# Patient Record
Sex: FEMALE | Race: WHITE | NOT HISPANIC OR LATINO | Employment: UNEMPLOYED | URBAN - NONMETROPOLITAN AREA
[De-identification: names, ages, dates, MRNs, and addresses within clinical notes are randomized per-mention and may not be internally consistent; named-entity substitution may affect disease eponyms.]

---

## 2021-04-23 LAB
ALBUMIN SERPL-MCNC: 4.1 G/DL (ref 3.4–5)
ALBUMIN/GLOB SERPL: 1.2 {RATIO}
ALP SERPL-CCNC: 96 U/L (ref 50–144)
ALT SERPL-CCNC: 11 U/L (ref 1–45)
ANION GAP SERPL CALC-SCNC: 7 MMOL/L (ref 7–16)
AST SERPL-CCNC: 17 U/L (ref 14–36)
BILIRUB SERPL-MCNC: 0.55 MG/DL (ref 0.1–1)
BUN SERPL-MCNC: 13 MG/DL (ref 7–20)
CALCIUM SERPL-MCNC: 9.4 MG/DL (ref 8.4–10.2)
CHLORIDE SERPL-SCNC: 101 MMOL/L (ref 94–110)
CHOLEST SERPL-MCNC: 236 MG/DL (ref 0–200)
CO2 SERPL-SCNC: 32 MMOL/L (ref 21–32)
CREAT SERPL-MCNC: 0.7 MG/DL (ref 0.52–1.04)
CREAT/UREA NIT SERPL: 18.6 (ref 12–20)
DEPRECATED CALCIDIOL+CALCIFEROL SERPL-MC: <20 NG/ML (ref 30–100)
GLOBULIN SER-MCNC: 3.5 G/DL (ref 2–3.9)
GLUCOSE SERPL-MCNC: 106 MGM./DL (ref 70–115)
HDLC SERPL-MCNC: 40 MG/DL (ref 40–60)
LDLC SERPL CALC-MCNC: 159 MG/DL (ref 30–100)
POTASSIUM SERPL-SCNC: 4.3 MMOL/L (ref 3.5–5.1)
PROT SERPL-MCNC: 7.6 G/DL (ref 6.3–8.2)
SODIUM SERPL-SCNC: 140 MMOL/L (ref 135–145)
TRIGL SERPL-MCNC: 154 MG/DL (ref 30–200)
TSH SERPL-ACNC: 1.98 UIU/ML (ref 0.36–3.74)

## 2021-04-26 ENCOUNTER — HISTORICAL (OUTPATIENT)
Dept: ADMINISTRATIVE | Facility: HOSPITAL | Age: 56
End: 2021-04-26

## 2021-05-06 ENCOUNTER — HISTORICAL (OUTPATIENT)
Dept: ADMINISTRATIVE | Facility: HOSPITAL | Age: 56
End: 2021-05-06

## 2021-05-24 ENCOUNTER — HISTORICAL (OUTPATIENT)
Dept: ADMINISTRATIVE | Facility: HOSPITAL | Age: 56
End: 2021-05-24

## 2021-05-24 LAB
BASOPHILS # BLD AUTO: 0.01 X10(3)/MCL (ref 0.01–0.08)
BASOPHILS NFR BLD AUTO: 0.1 % (ref 0.1–1.2)
EOSINOPHIL # BLD AUTO: 0 X10(3)/MCL (ref 0.04–0.36)
EOSINOPHIL NFR BLD AUTO: 0 % (ref 0.7–7)
ERYTHROCYTE [DISTWIDTH] IN BLOOD BY AUTOMATED COUNT: 13.5 % (ref 11–14.5)
HCT VFR BLD AUTO: 42.2 % (ref 36–48)
HGB BLD-MCNC: 13.2 G/DL (ref 11.8–16)
IMM GRANULOCYTES # BLD AUTO: 0.04 X10E3/UL (ref 0–0.03)
IMM GRANULOCYTES NFR BLD AUTO: 0.3 % (ref 0–0.5)
LYMPHOCYTES # BLD AUTO: 1.14 X10(3)/MCL (ref 1.16–3.74)
LYMPHOCYTES NFR BLD AUTO: 7.9 % (ref 20–55)
MCH RBC QN AUTO: 26.8 PG (ref 27–34)
MCHC RBC AUTO-ENTMCNC: 31.3 G/DL (ref 31–37)
MCV RBC AUTO: 85.6 FL (ref 79–99)
MONOCYTES # BLD AUTO: 1.02 X10(3)/MCL (ref 0.24–0.36)
MONOCYTES NFR BLD AUTO: 7.1 % (ref 4.7–12.5)
NEUTROPHILS # BLD AUTO: 12.21 X10(3)/MCL (ref 1.56–6.13)
NEUTROPHILS NFR BLD AUTO: 84.6 % (ref 37–73)
PLATELET # BLD AUTO: 342 X10(3)/MCL (ref 140–371)
PMV BLD AUTO: 9.9 FL (ref 9.4–12.4)
RBC # BLD AUTO: 4.93 X10(6)/MCL (ref 4–5.1)
WBC # SPEC AUTO: 14.4 X10(3)/MCL (ref 4–11.5)

## 2021-06-22 ENCOUNTER — HISTORICAL (OUTPATIENT)
Dept: ADMINISTRATIVE | Facility: HOSPITAL | Age: 56
End: 2021-06-22

## 2021-12-30 LAB
ALBUMIN SERPL-MCNC: 3.7 G/DL (ref 3.4–5)
ALBUMIN/GLOB SERPL: 1.3 {RATIO}
ALP SERPL-CCNC: 90 U/L (ref 50–144)
ALT SERPL-CCNC: 17 U/L (ref 1–45)
ANION GAP SERPL CALC-SCNC: 0 MMOL/L (ref 2–13)
AST SERPL-CCNC: 20 U/L (ref 14–36)
BASOPHILS # BLD AUTO: 0.06 10*3/UL (ref 0.01–0.08)
BASOPHILS NFR BLD AUTO: 0.7 % (ref 0.1–1.2)
BILIRUB SERPL-MCNC: 0.36 MG/DL (ref 0–1)
BUN SERPL-MCNC: 19 MG/DL (ref 7–20)
CALCIUM SERPL-MCNC: 9.3 MG/DL (ref 8.4–10.2)
CHLORIDE SERPL-SCNC: 105 MMOL/L (ref 94–110)
CHOLEST SERPL-MCNC: 212 MG/DL (ref 0–200)
CO2 SERPL-SCNC: 32 MMOL/L (ref 21–32)
CREAT SERPL-MCNC: 0.8 MG/DL (ref 0.52–1.04)
CREAT/UREA NIT SERPL: 23.8 (ref 12–20)
DEPRECATED CALCIDIOL+CALCIFEROL SERPL-MC: <20 NG/ML (ref 30–100)
EOSINOPHIL # BLD AUTO: 0.18 10*3/UL (ref 0.04–0.36)
EOSINOPHIL NFR BLD AUTO: 2.1 % (ref 0.7–7)
ERYTHROCYTE [DISTWIDTH] IN BLOOD BY AUTOMATED COUNT: 13.8 % (ref 11–14.5)
EST. AVERAGE GLUCOSE BLD GHB EST-MCNC: 135 MG/DL (ref 70–115)
GLOBULIN SER-MCNC: 2.9 G/DL (ref 2–3.9)
GLUCOSE SERPL-MCNC: 108 MGM./DL (ref 70–115)
HBA1C MFR BLD: 6.5 % (ref 4–6)
HCT VFR BLD AUTO: 40 % (ref 36–48)
HDLC SERPL-MCNC: 43 MG/DL (ref 40–60)
HGB BLD-MCNC: 12.5 G/DL (ref 11.8–16)
IMM GRANULOCYTES # BLD AUTO: 0.04 10*3/UL (ref 0–0.03)
IMM GRANULOCYTES NFR BLD AUTO: 0.5 % (ref 0–0.5)
LDLC SERPL CALC-MCNC: 139.6 MG/DL (ref 30–100)
LYMPHOCYTES # BLD AUTO: 2.06 10*3/UL (ref 1.16–3.74)
LYMPHOCYTES NFR BLD AUTO: 23.8 % (ref 20–55)
MCH RBC QN AUTO: 27.5 PG (ref 27–34)
MCHC RBC AUTO-ENTMCNC: 31.3 G/DL (ref 31–37)
MCV RBC AUTO: 87.9 FL (ref 79–99)
MONOCYTES # BLD AUTO: 0.78 10*3/UL (ref 0.24–0.36)
MONOCYTES NFR BLD AUTO: 9 % (ref 4.7–12.5)
NEUTROPHILS # BLD AUTO: 5.55 10*3/UL (ref 1.56–6.13)
NEUTROPHILS NFR BLD AUTO: 63.9 % (ref 37–73)
NONINV COLON CA DNA+OCC BLD SCRN STL QL: POSITIVE
PLATELET # BLD AUTO: 342 10*3/UL (ref 140–371)
PMV BLD AUTO: 10.1 FL (ref 9.4–12.4)
POTASSIUM SERPL-SCNC: 4.6 MMOL/L (ref 3.5–5.1)
PROT SERPL-MCNC: 6.6 G/DL (ref 6.3–8.2)
RBC # BLD AUTO: 4.55 10*6/UL (ref 4–5.1)
SODIUM SERPL-SCNC: 137 MMOL/L (ref 135–145)
TRIGL SERPL-MCNC: 180 MG/DL (ref 30–200)
TSH SERPL-ACNC: 1.56 UIU/ML (ref 0.36–3.74)
WBC # SPEC AUTO: 8.7 10*3/UL (ref 4–11.5)

## 2022-01-13 ENCOUNTER — HISTORICAL (OUTPATIENT)
Dept: ADMINISTRATIVE | Facility: HOSPITAL | Age: 57
End: 2022-01-13

## 2022-04-10 ENCOUNTER — HISTORICAL (OUTPATIENT)
Dept: ADMINISTRATIVE | Facility: HOSPITAL | Age: 57
End: 2022-04-10
Payer: MEDICAID

## 2022-04-26 VITALS
BODY MASS INDEX: 35.5 KG/M2 | WEIGHT: 200.38 LBS | DIASTOLIC BLOOD PRESSURE: 72 MMHG | HEIGHT: 63 IN | SYSTOLIC BLOOD PRESSURE: 115 MMHG | OXYGEN SATURATION: 97 %

## 2022-05-04 ENCOUNTER — HISTORICAL (OUTPATIENT)
Dept: ADMINISTRATIVE | Facility: HOSPITAL | Age: 57
End: 2022-05-04
Payer: MEDICAID

## 2022-06-20 ENCOUNTER — HISTORICAL (OUTPATIENT)
Dept: ADMINISTRATIVE | Facility: HOSPITAL | Age: 57
End: 2022-06-20

## 2022-06-22 ENCOUNTER — HISTORICAL (OUTPATIENT)
Dept: ADMINISTRATIVE | Facility: HOSPITAL | Age: 57
End: 2022-06-22

## 2022-09-29 ENCOUNTER — OFFICE VISIT (OUTPATIENT)
Dept: OTOLARYNGOLOGY | Facility: CLINIC | Age: 57
End: 2022-09-29
Payer: MEDICAID

## 2022-09-29 VITALS
HEIGHT: 63 IN | OXYGEN SATURATION: 97 % | BODY MASS INDEX: 33.25 KG/M2 | WEIGHT: 187.63 LBS | TEMPERATURE: 99 F | DIASTOLIC BLOOD PRESSURE: 75 MMHG | SYSTOLIC BLOOD PRESSURE: 133 MMHG | HEART RATE: 84 BPM

## 2022-09-29 DIAGNOSIS — J31.0 CHRONIC RHINITIS: Primary | ICD-10-CM

## 2022-09-29 PROCEDURE — 99213 OFFICE O/P EST LOW 20 MIN: CPT | Mod: PBBFAC | Performed by: OTOLARYNGOLOGY

## 2022-09-29 RX ORDER — BUDESONIDE, GLYCOPYRROLATE, AND FORMOTEROL FUMARATE 160; 9; 4.8 UG/1; UG/1; UG/1
2 AEROSOL, METERED RESPIRATORY (INHALATION) 2 TIMES DAILY
COMMUNITY
Start: 2022-09-01 | End: 2023-07-26

## 2022-09-29 RX ORDER — ASPIRIN 325 MG
1250 TABLET, DELAYED RELEASE (ENTERIC COATED) ORAL
COMMUNITY
Start: 2021-12-23 | End: 2023-04-05

## 2022-09-29 RX ORDER — ALBUTEROL SULFATE 90 UG/1
AEROSOL, METERED RESPIRATORY (INHALATION)
COMMUNITY
Start: 2022-09-23 | End: 2022-12-11

## 2022-09-29 RX ORDER — NYSTATIN 100000 [USP'U]/ML
SUSPENSION ORAL
COMMUNITY
Start: 2022-01-19 | End: 2023-04-05 | Stop reason: SDUPTHER

## 2022-09-29 RX ORDER — ALBUTEROL SULFATE 0.83 MG/ML
SOLUTION RESPIRATORY (INHALATION)
COMMUNITY
Start: 2022-06-05 | End: 2022-12-11

## 2022-09-29 RX ORDER — TRIAMCINOLONE ACETONIDE 1 MG/G
OINTMENT TOPICAL 2 TIMES DAILY
COMMUNITY
Start: 2022-06-17

## 2022-09-29 RX ORDER — FLUTICASONE PROPIONATE 50 MCG
2 SPRAY, SUSPENSION (ML) NASAL DAILY
Qty: 15.8 ML | Refills: 12 | Status: SHIPPED | OUTPATIENT
Start: 2022-09-29 | End: 2023-07-26

## 2022-09-29 RX ORDER — ALBUTEROL SULFATE 0.83 MG/ML
2.5 SOLUTION RESPIRATORY (INHALATION)
COMMUNITY
Start: 2022-01-06 | End: 2022-12-11

## 2022-09-29 RX ORDER — BENZONATATE 200 MG/1
200 CAPSULE ORAL
COMMUNITY
Start: 2022-01-19 | End: 2023-12-05 | Stop reason: SDUPTHER

## 2022-09-29 RX ORDER — FLUTICASONE PROPIONATE 50 MCG
1 SPRAY, SUSPENSION (ML) NASAL 2 TIMES DAILY
COMMUNITY
Start: 2022-09-22 | End: 2022-09-29

## 2022-09-29 RX ORDER — MUPIROCIN 20 MG/G
OINTMENT TOPICAL
COMMUNITY
Start: 2022-05-28

## 2022-09-29 RX ORDER — IPRATROPIUM BROMIDE 0.5 MG/2.5ML
SOLUTION RESPIRATORY (INHALATION)
COMMUNITY
Start: 2022-08-22

## 2022-09-29 NOTE — PROGRESS NOTES
Patient Name: Ricardo Daley   YOB: 1965     Chief Complaint:   Chief Complaint   Patient presents with    Follow-up     Has hoarseness that pt states is from coughing due to COPD        History of Present Illness:  56-year-old female here with a complaint of right nasal obstruction. Onset approximately 3 years, no history of local pain, swelling or bleeding. She has severe COPD, stage IV, and is chronically short of breath. With the nasal obstruction on the right which is daily, it is hastens her anxiety about her limitations in her breathing due to her COPD. No history of nasal trauma, nasal surgery, like allergies or sinus disease. Denies any hearing loss, tinnitus, aural fullness, otalgia or vertigo.  Currently down to 3 cigarettes a day and progressing complete cessation.    2-24-22: Patient is a follow-up,, finds Flonase helps significantly and she is breathing much better. When her nasal function improves her COPD is improved.  Patient also asked for referral for her psoriasis that is progressing on her lower extremities and will do so.    September 29, 2022:   57-year-old female presents today for follow-up of her chronic rhinitis.  Patient continues to use fluticasone nasal spray on a routine basis with good control of any nasal obstruction or congestion she may have.  Currently she has no complaints of nasal obstruction.  No rhinorrhea or postnasal drainage.  She does need refill for her fluticasone nasal spray.  She does not have any other new problems today.  Of note she does continue to smoke 3-4 cigarettes per day.  She does continue her efforts to quit smoking.    Past Medical History:  Past Medical History:   Diagnosis Date    COPD (chronic obstructive pulmonary disease)      Past Surgical History:   Procedure Laterality Date    BREAST SURGERY Right     CHOLECYSTECTOMY      HERNIA REPAIR         Review of Systems:  Unremarkable except as mentioned above.    Current Medications:  Current  "Outpatient Medications   Medication Sig    albuterol (PROVENTIL) 2.5 mg /3 mL (0.083 %) nebulizer solution Inhale 2.5 mg into the lungs.    albuterol (PROVENTIL) 2.5 mg /3 mL (0.083 %) nebulizer solution SMARTSIG:3 Milliliter(s) Via Nebulizer Every 6 Hours PRN    albuterol (PROVENTIL/VENTOLIN HFA) 90 mcg/actuation inhaler SMARTSI Puff(s) By Mouth Every 6 Hours    benzonatate (TESSALON) 200 MG capsule Take 200 mg by mouth.    BREZTRI AEROSPHERE 160-9-4.8 mcg/actuation HFAA Inhale 2 puffs into the lungs 2 (two) times daily.    cholecalciferol, vitamin D3, 1,250 mcg (50,000 unit) capsule Take 1,250 mcg by mouth.    fluticasone propionate (FLONASE) 50 mcg/actuation nasal spray 1 spray by Each Nostril route 2 (two) times daily.    ipratropium (ATROVENT) 0.02 % nebulizer solution SMARTSI Vial(s) Via Nebulizer 4 Times Daily PRN    mupirocin (BACTROBAN) 2 % ointment SMARTSIG:Sparingly Topical 3 Times Daily    nystatin (MYCOSTATIN) 100,000 unit/mL suspension Take by mouth.    triamcinolone acetonide 0.1% (KENALOG) 0.1 % ointment Apply topically 2 (two) times daily.     No current facility-administered medications for this visit.        Allergies:  Review of patient's allergies indicates:   Allergen Reactions    Lincocin [lincomycin] Other (See Comments)     unknown    Penicillins Itching        Physical Exam:  Vital signs:   Vitals:    22 0819   BP: 133/75   BP Location: Left arm   Patient Position: Sitting   BP Method: Large (Automatic)   Pulse: 84   Temp: 98.6 °F (37 °C)   TempSrc: Oral   SpO2: 97%   Weight: 85.1 kg (187 lb 9.8 oz)   Height: 5' 3" (1.6 m)   General:  Well-developed well-nourished female in no acute distress.  Voice is normal.  Head and face:  Normocephalic.  No facial lesions.  No temporomandibular joint tenderness or click.  Ears:  Right ear-auricle is normally developed.  External auditory canal is clear.  Tympanic membrane is nonerythematous.  No middle ear effusion.  Left ear-auricle is " normally developed.  External auditory canal is clear.  Tympanic membrane is nonerythematous.  No middle ear effusion.  Nose:  Nasal dorsum unremarkable.  She has slight right septal deviation.  Minimal nasal congestion.  No abnormal drainage or secretions.    Neck:  Supple without adenopathy or thyromegaly.  Trachea is in the midline.  Parotid and submandibular glands are normal to palpation without masses or tenderness.  Eyes:  Extraocular muscles intact.  No nystagmus.  No exophthalmos or enophthalmos.  Neurologic:  Alert and oriented.  Cranial nerves 2-12 are grossly normal.        Assessment/Plan:  Chronic rhinitis-stable.      Plan:  Continue fluticasone nasal spray 2 puffs each nostril q.d..  Refill sent  Follow-up in 12 months.      Sagar Arnett M.D.

## 2022-10-28 ENCOUNTER — HISTORICAL (OUTPATIENT)
Dept: ADMINISTRATIVE | Facility: HOSPITAL | Age: 57
End: 2022-10-28

## 2022-12-11 ENCOUNTER — HOSPITAL ENCOUNTER (EMERGENCY)
Facility: HOSPITAL | Age: 57
Discharge: HOME | End: 2022-12-11
Attending: EMERGENCY MEDICINE
Payer: MEDICAID

## 2022-12-11 VITALS
BODY MASS INDEX: 34.55 KG/M2 | SYSTOLIC BLOOD PRESSURE: 183 MMHG | DIASTOLIC BLOOD PRESSURE: 93 MMHG | WEIGHT: 195 LBS | HEIGHT: 63 IN | RESPIRATION RATE: 22 BRPM | TEMPERATURE: 99 F | OXYGEN SATURATION: 98 % | HEART RATE: 105 BPM

## 2022-12-11 DIAGNOSIS — R21 RASH: Primary | ICD-10-CM

## 2022-12-11 PROCEDURE — 99284 EMERGENCY DEPT VISIT MOD MDM: CPT

## 2022-12-11 PROCEDURE — 63600175 PHARM REV CODE 636 W HCPCS: Performed by: NURSE PRACTITIONER

## 2022-12-11 RX ORDER — OXYCODONE AND ACETAMINOPHEN 10; 325 MG/1; MG/1
1 TABLET ORAL EVERY 6 HOURS PRN
Qty: 10 TABLET | Refills: 0 | Status: SHIPPED | OUTPATIENT
Start: 2022-12-11 | End: 2023-04-05

## 2022-12-11 RX ORDER — PREDNISONE 20 MG/1
40 TABLET ORAL DAILY
Qty: 10 TABLET | Refills: 0 | Status: SHIPPED | OUTPATIENT
Start: 2022-12-11 | End: 2022-12-16

## 2022-12-11 RX ORDER — PREDNISONE 20 MG/1
40 TABLET ORAL
Status: COMPLETED | OUTPATIENT
Start: 2022-12-11 | End: 2022-12-11

## 2022-12-11 RX ORDER — ALBUTEROL SULFATE 90 UG/1
1-2 AEROSOL, METERED RESPIRATORY (INHALATION) EVERY 6 HOURS PRN
Qty: 6.7 G | Refills: 0 | Status: SHIPPED | OUTPATIENT
Start: 2022-12-11 | End: 2023-02-24

## 2022-12-11 RX ADMIN — PREDNISONE 40 MG: 20 TABLET ORAL at 12:12

## 2022-12-11 NOTE — ED NOTES
Pt has rash  covering bilateral lower legs that she states has been there for months and symptoms have exacerbated today.

## 2022-12-11 NOTE — ED PROVIDER NOTES
Encounter Date: 12/11/2022       History     Chief Complaint   Patient presents with    Rash     Pt c/o pain to bilateral pain, pt states she has infection to psoriasis in legs x several months. Pt states finished antibiotics  3 weeks ago and prednisone 2 weeks ago and still having pain and thinks symptoms are getting worse.     Patient complains of bilateral chronic psoriatic rash in her lower extremities.  States that recently she is been on steroids and antibiotics.  She thinks the site was infected it did improve with steroids.  She denies any significant worsening of the condition but that the pain is unmanageable recently.    The history is provided by the patient.   Rash   This is a new problem. The current episode started several weeks ago. The problem has been unchanged. The problem is associated with nothing. Associated symptoms include pain and weeping. Pertinent negatives include no blisters and no itching. She has tried nothing for the symptoms.   Review of patient's allergies indicates:   Allergen Reactions    Lincocin [lincomycin] Other (See Comments)     unknown    Penicillins Itching    Evan-24 [theophylline] Hallucinations    Clobetasol propionate Rash     Past Medical History:   Diagnosis Date    COPD (chronic obstructive pulmonary disease)      Past Surgical History:   Procedure Laterality Date    BREAST SURGERY Right     CHOLECYSTECTOMY      HERNIA REPAIR       Family History   Problem Relation Age of Onset    Stroke Mother     Diabetes Mother     Heart disease Mother      Social History     Tobacco Use    Smoking status: Every Day     Packs/day: 0.25     Types: Cigarettes     Start date: 1983    Smokeless tobacco: Never   Substance Use Topics    Alcohol use: Not Currently    Drug use: Never     Review of Systems   Constitutional:  Negative for fever.   HENT:  Negative for sore throat.    Respiratory:  Positive for cough (Chronic cough associated with COPD.  Denies worsening of this problem.).  Negative for shortness of breath.    Cardiovascular:  Negative for chest pain.   Gastrointestinal:  Negative for nausea.   Genitourinary:  Negative for dysuria.   Musculoskeletal:  Negative for back pain.   Skin:  Positive for rash. Negative for itching.   Neurological:  Negative for weakness.   Hematological:  Does not bruise/bleed easily.     Physical Exam     Initial Vitals [12/11/22 1138]   BP Pulse Resp Temp SpO2   (!) 183/93 105 (!) 22 98.8 °F (37.1 °C) 98 %      MAP       --         Physical Exam    Nursing note and vitals reviewed.  Constitutional: She appears well-developed and well-nourished. She is not diaphoretic. She is active.  Non-toxic appearance. No distress.   HENT:   Head: Normocephalic and atraumatic.   Eyes: Conjunctivae are normal. Right eye exhibits no discharge. Left eye exhibits no discharge. No scleral icterus.   Neck:   Normal range of motion.  Cardiovascular:  Normal rate, regular rhythm and intact distal pulses.           No murmur heard.  Pulmonary/Chest: Breath sounds normal. No respiratory distress. She has no wheezes.   Abdominal: She exhibits no distension.   Musculoskeletal:         General: No tenderness. Normal range of motion.      Cervical back: Normal range of motion.     Neurological: She is alert and oriented to person, place, and time. No cranial nerve deficit. GCS score is 15. GCS eye subscore is 4. GCS verbal subscore is 5. GCS motor subscore is 6.   Skin: Skin is warm and dry. Capillary refill takes less than 2 seconds. Rash (Bilateral lower extremities are severely erythematous appear to be covered with a medicinal cream.) noted.   Psychiatric: She has a normal mood and affect. Her behavior is normal. Judgment and thought content normal.       ED Course   Procedures  Labs Reviewed - No data to display       Imaging Results    None          Medications   predniSONE tablet 40 mg (40 mg Oral Given 12/11/22 1255)                              Clinical Impression:   Final  diagnoses:  [R21] Rash (Primary)        ED Disposition Condition    Discharge Stable          ED Prescriptions       Medication Sig Dispense Start Date End Date Auth. Provider    predniSONE (DELTASONE) 20 MG tablet Take 2 tablets (40 mg total) by mouth once daily. for 5 days 10 tablet 12/11/2022 12/16/2022 Rajeev Cole NP    oxyCODONE-acetaminophen (PERCOCET)  mg per tablet Take 1 tablet by mouth every 6 (six) hours as needed for Pain. 10 tablet 12/11/2022 -- Rjaeev Cole NP    albuterol (PROVENTIL/VENTOLIN HFA) 90 mcg/actuation inhaler Inhale 1-2 puffs into the lungs every 6 (six) hours as needed for Wheezing. Rescue 6.7 g 12/11/2022 12/11/2023 Rajeev Cole NP          Follow-up Information       Follow up With Specialties Details Why Contact Info    [facility] Schedule an appointment as soon as possible for a visit  As needed [address] [facility] [id number]             Rajeev Cole NP  12/11/22 5223

## 2023-01-30 ENCOUNTER — DOCUMENTATION ONLY (OUTPATIENT)
Dept: ADMINISTRATIVE | Facility: HOSPITAL | Age: 58
End: 2023-01-30
Payer: MEDICAID

## 2023-03-28 PROCEDURE — 82306 VITAMIN D 25 HYDROXY: CPT | Performed by: NURSE PRACTITIONER

## 2023-03-28 PROCEDURE — 85025 COMPLETE CBC W/AUTO DIFF WBC: CPT | Performed by: NURSE PRACTITIONER

## 2023-03-28 PROCEDURE — 83036 HEMOGLOBIN GLYCOSYLATED A1C: CPT | Performed by: NURSE PRACTITIONER

## 2023-03-28 PROCEDURE — 80053 COMPREHEN METABOLIC PANEL: CPT | Performed by: NURSE PRACTITIONER

## 2023-04-05 ENCOUNTER — OFFICE VISIT (OUTPATIENT)
Dept: FAMILY MEDICINE | Facility: CLINIC | Age: 58
End: 2023-04-05
Payer: MEDICAID

## 2023-04-05 VITALS
HEART RATE: 85 BPM | SYSTOLIC BLOOD PRESSURE: 150 MMHG | WEIGHT: 189.81 LBS | DIASTOLIC BLOOD PRESSURE: 70 MMHG | BODY MASS INDEX: 33.63 KG/M2 | HEIGHT: 63 IN | OXYGEN SATURATION: 98 % | TEMPERATURE: 98 F

## 2023-04-05 DIAGNOSIS — E55.9 VITAMIN D DEFICIENCY: ICD-10-CM

## 2023-04-05 DIAGNOSIS — J44.9 CHRONIC OBSTRUCTIVE PULMONARY DISEASE, UNSPECIFIED COPD TYPE: Primary | ICD-10-CM

## 2023-04-05 DIAGNOSIS — E11.9 TYPE 2 DIABETES MELLITUS WITHOUT COMPLICATION, WITHOUT LONG-TERM CURRENT USE OF INSULIN: ICD-10-CM

## 2023-04-05 DIAGNOSIS — B37.0 THRUSH: ICD-10-CM

## 2023-04-05 DIAGNOSIS — L40.9 PSORIASIS: ICD-10-CM

## 2023-04-05 DIAGNOSIS — I10 HYPERTENSION, UNSPECIFIED TYPE: ICD-10-CM

## 2023-04-05 PROBLEM — M54.16 LUMBAR RADICULOPATHY: Status: ACTIVE | Noted: 2023-04-05

## 2023-04-05 PROBLEM — Z78.9 STATIN INTOLERANCE: Status: ACTIVE | Noted: 2023-04-05

## 2023-04-05 PROBLEM — E78.5 HYPERLIPIDEMIA: Status: ACTIVE | Noted: 2023-04-05

## 2023-04-05 PROBLEM — E66.9 OBESITY: Status: ACTIVE | Noted: 2023-04-05

## 2023-04-05 PROBLEM — R73.01 IMPAIRED FASTING GLUCOSE: Status: ACTIVE | Noted: 2023-04-05

## 2023-04-05 PROBLEM — Z82.49 FAMILY HISTORY OF CORONARY ARTERY DISEASE: Status: ACTIVE | Noted: 2023-04-05

## 2023-04-05 PROCEDURE — 3008F PR BODY MASS INDEX (BMI) DOCUMENTED: ICD-10-PCS | Mod: CPTII,,, | Performed by: NURSE PRACTITIONER

## 2023-04-05 PROCEDURE — 3077F PR MOST RECENT SYSTOLIC BLOOD PRESSURE >= 140 MM HG: ICD-10-PCS | Mod: CPTII,,, | Performed by: NURSE PRACTITIONER

## 2023-04-05 PROCEDURE — 1160F PR REVIEW ALL MEDS BY PRESCRIBER/CLIN PHARMACIST DOCUMENTED: ICD-10-PCS | Mod: CPTII,,, | Performed by: NURSE PRACTITIONER

## 2023-04-05 PROCEDURE — 3078F PR MOST RECENT DIASTOLIC BLOOD PRESSURE < 80 MM HG: ICD-10-PCS | Mod: CPTII,,, | Performed by: NURSE PRACTITIONER

## 2023-04-05 PROCEDURE — 3077F SYST BP >= 140 MM HG: CPT | Mod: CPTII,,, | Performed by: NURSE PRACTITIONER

## 2023-04-05 PROCEDURE — 1160F RVW MEDS BY RX/DR IN RCRD: CPT | Mod: CPTII,,, | Performed by: NURSE PRACTITIONER

## 2023-04-05 PROCEDURE — 1159F PR MEDICATION LIST DOCUMENTED IN MEDICAL RECORD: ICD-10-PCS | Mod: CPTII,,, | Performed by: NURSE PRACTITIONER

## 2023-04-05 PROCEDURE — 3078F DIAST BP <80 MM HG: CPT | Mod: CPTII,,, | Performed by: NURSE PRACTITIONER

## 2023-04-05 PROCEDURE — 99214 PR OFFICE/OUTPT VISIT, EST, LEVL IV, 30-39 MIN: ICD-10-PCS | Mod: ,,, | Performed by: NURSE PRACTITIONER

## 2023-04-05 PROCEDURE — 1159F MED LIST DOCD IN RCRD: CPT | Mod: CPTII,,, | Performed by: NURSE PRACTITIONER

## 2023-04-05 PROCEDURE — 99214 OFFICE O/P EST MOD 30 MIN: CPT | Mod: ,,, | Performed by: NURSE PRACTITIONER

## 2023-04-05 PROCEDURE — 3008F BODY MASS INDEX DOCD: CPT | Mod: CPTII,,, | Performed by: NURSE PRACTITIONER

## 2023-04-05 RX ORDER — ALBUTEROL SULFATE 90 UG/1
2 AEROSOL, METERED RESPIRATORY (INHALATION) EVERY 6 HOURS
Qty: 18 G | Refills: 11 | Status: SHIPPED | OUTPATIENT
Start: 2023-04-05 | End: 2023-07-26

## 2023-04-05 RX ORDER — ALBUTEROL SULFATE 0.83 MG/ML
2.5 SOLUTION RESPIRATORY (INHALATION) EVERY 6 HOURS PRN
Qty: 3 ML | Refills: 11 | Status: SHIPPED | OUTPATIENT
Start: 2023-04-05 | End: 2023-07-27 | Stop reason: SDUPTHER

## 2023-04-05 RX ORDER — ERGOCALCIFEROL 1.25 MG/1
50000 CAPSULE ORAL
COMMUNITY
Start: 2022-01-19 | End: 2023-04-05 | Stop reason: SDUPTHER

## 2023-04-05 RX ORDER — ERGOCALCIFEROL 1.25 MG/1
50000 CAPSULE ORAL
Qty: 12 CAPSULE | Refills: 0 | Status: SHIPPED | OUTPATIENT
Start: 2023-04-05 | End: 2023-06-19

## 2023-04-05 RX ORDER — ROSUVASTATIN CALCIUM 20 MG/1
20 TABLET, COATED ORAL
COMMUNITY
Start: 2023-03-21

## 2023-04-05 RX ORDER — NYSTATIN 100000 [USP'U]/ML
4 SUSPENSION ORAL
Qty: 473 ML | Refills: 2 | Status: SHIPPED | OUTPATIENT
Start: 2023-04-05 | End: 2023-09-05

## 2023-04-05 RX ORDER — AMLODIPINE BESYLATE 5 MG/1
5 TABLET ORAL
COMMUNITY
Start: 2023-03-21

## 2023-04-05 NOTE — PROGRESS NOTES
Patient ID: Ricardo Daley  : 1965    Chief Complaint: Follow-up (6mth f/u)    Allergies: Patient is allergic to latex, lincocin [lincomycin], penicillins, olga-24 [theophylline], and clobetasol propionate.     History of Present Illness:  The patient is a 58 y.o. White female who presents to clinic for follow up on Follow-up (6mth f/u)     She is doing well in general.  Saw Dr. Webb at the beginning of this year.  She remains on Breztri and is doing well on this medication; uses albuterol INH occasionally. Nebs when she becomes ill or has exacerbation.  He plans to do pulmonary function test and chest x-ray at her follow-up in April. She is still smoking.     Established with Cardiology.    She has also seen dermatology for her plaque psoriasis. Since she has seen them her psoriasis has improved significantly.        Social History:  reports that she has been smoking cigarettes. She started smoking about 40 years ago. She has been smoking an average of .25 packs per day. She has never used smokeless tobacco. She reports that she does not currently use alcohol. She reports that she does not use drugs.    Past Medical History:  has a past medical history of Carpal tunnel syndrome, Chronic bronchitis with wheezing, Chronic lumbar radiculopathy, COPD (chronic obstructive pulmonary disease), Family history of early CAD, Hyperlipidemia, IFG (impaired fasting glucose), Psoriasis, and Statin intolerance.    Current Medications:  Current Outpatient Medications   Medication Instructions    albuterol (PROVENTIL) 2.5 mg, Nebulization, Every 6 hours PRN, Rescue    albuterol (PROVENTIL/VENTOLIN HFA) 90 mcg/actuation inhaler 2 puffs, Inhalation, Every 6 hours, Rescue    amLODIPine (NORVASC) 5 mg, Oral    benzonatate (TESSALON) 200 mg, Oral    BREZTRI AEROSPHERE 160-9-4.8 mcg/actuation HFAA 2 puffs, Inhalation, 2 times daily    ergocalciferol (ERGOCALCIFEROL) 50,000 Units, Oral, Every 7 days    fluticasone propionate  "(FLONASE) 100 mcg, Each Nostril, Daily    ipratropium (ATROVENT) 0.02 % nebulizer solution SMARTSI Vial(s) Via Nebulizer 4 Times Daily PRN    mupirocin (BACTROBAN) 2 % ointment SMARTSIG:Sparingly Topical 3 Times Daily    nystatin (MYCOSTATIN) 400,000 Units, Oral, 4 times daily with meals & nightly    rosuvastatin (CRESTOR) 20 mg, Oral    triamcinolone acetonide 0.1% (KENALOG) 0.1 % ointment Topical (Top), 2 times daily       Review of Systems   See HPI    Visit Vitals  BP (!) 150/70 (BP Location: Left arm)   Pulse 85   Temp 98.2 °F (36.8 °C) (Temporal)   Ht 5' 3" (1.6 m)   Wt 86.1 kg (189 lb 12.8 oz)   SpO2 98%   BMI 33.62 kg/m²       Physical Exam  Constitutional:       Appearance: Normal appearance.   Cardiovascular:      Heart sounds: Normal heart sounds.   Pulmonary:      Breath sounds: Normal breath sounds.   Skin:     General: Skin is warm and dry.   Psychiatric:         Mood and Affect: Mood normal.        Labs Reviewed:    Chemistry:  Lab Results   Component Value Date     2023    K 3.4 (L) 2023    CHLORIDE 101 2023    BUN 10.0 2023    CREATININE 0.84 2023    EGFRNORACEVR 80 2023    GLUCOSE 155 (H) 2023    CALCIUM 9.2 2023    ALKPHOS 100 2023    LABPROT 7.2 2023    ALBUMIN 4.2 2023    AST 21 2023    ALT 16 2023    [id number] 26.0 (L) 2023    TSH 1.56 2021        Lab Results   Component Value Date    HGBA1C 6.7 (H) 2023        Hematology:  Lab Results   Component Value Date    WBC 8.5 2023    RBC 5.41 (H) 2023    HGB 14.0 2023    HCT 44.2 2023    MCV 81.7 2023    MCH 25.9 (L) 2023    MCHC 31.7 2023    RDW 13.8 2023     2023    MPV 9.6 2023         Assessment & Plan:    1. Chronic obstructive pulmonary disease, unspecified COPD type  Follow-up with pulmonary provider as scheduled.  -     albuterol (PROVENTIL/VENTOLIN HFA) 90 mcg/actuation " inhaler; Inhale 2 puffs into the lungs every 6 (six) hours. Rescue  Dispense: 18 g; Refill: 11  -     albuterol (PROVENTIL) 2.5 mg /3 mL (0.083 %) nebulizer solution; Take 3 mLs (2.5 mg total) by nebulization every 6 (six) hours as needed for Wheezing. Rescue  Dispense: 3 mL; Refill: 11    2. Thrush  Reminded to rinse now well after each inhalation respiratory medications to avoid candidal infection.  -     nystatin (MYCOSTATIN) 100,000 unit/mL suspension; Take 4 mLs (400,000 Units total) by mouth 4 (four) times daily with meals and nightly.  Dispense: 473 mL; Refill: 2    3. Type 2 diabetes mellitus without complication, without long-term current use of insulin  Last A1c was 6.4%, now A1c 6.7%  She refuses diabetic medication initiation; Continue Lifestyle changes.   Take all medications as directed; compliance is critical for managing your diabetes.   Follow American Diabetic Association (ADA) dietary guidelines for eating and implement exercise into your daily regimen.   Check your glucose levels each morning and record for review at follow up in 3 months.    -     Hemoglobin A1C; Future; Expected date: 07/05/2023  -     Lipid Panel; Future; Expected date: 07/05/2023  -     Comprehensive Metabolic Panel; Future; Expected date: 07/05/2023    4. Hypertension, unspecified type  Well controlled.   Continue Amlodipine 5 mg daily  Low Sodium Diet (DASH Diet - Less than 2 grams of sodium per day).  Monitor blood pressure daily and log. Report consistent numbers greater than 140/90.  Maintain healthy weight with goal BMI <30. Exercise 30 minutes per day, 5 days per week.  Smoking cessation encouraged to aid in BP reduction.    5. Psoriasis  Follow-up with dermatology as scheduled    6. Vitamin D deficiency  -     ergocalciferol (ERGOCALCIFEROL) 50,000 unit Cap; Take 1 capsule (50,000 Units total) by mouth every 7 days.  Dispense: 12 capsule; Refill: 0        Future Appointments   Date Time Provider Department Center    6/28/2023  8:40 AM [facility] L[facility]   7/5/2023  1:00 PM PEYTON Urbina [facility]   10/3/2023  8:00 AM Sagar Arnett MD [facility]   10/4/2023  9:00 AM [facility]ER[facility] Lott [facility]   10/12/2023  2:30 PM PEYTON Urbina [facility]       Follow up in about 3 months (around 7/5/2023), or if symptoms worsen or fail to improve, for labs prior. Call sooner if needed.    PEYTON Ying

## 2023-06-19 DIAGNOSIS — E55.9 VITAMIN D DEFICIENCY: ICD-10-CM

## 2023-06-19 RX ORDER — ERGOCALCIFEROL 1.25 MG/1
CAPSULE ORAL
Qty: 12 CAPSULE | Refills: 0 | Status: SHIPPED | OUTPATIENT
Start: 2023-06-19

## 2023-06-28 PROCEDURE — 80053 COMPREHEN METABOLIC PANEL: CPT | Performed by: NURSE PRACTITIONER

## 2023-06-28 PROCEDURE — 83036 HEMOGLOBIN GLYCOSYLATED A1C: CPT | Performed by: NURSE PRACTITIONER

## 2023-06-28 PROCEDURE — 80061 LIPID PANEL: CPT | Performed by: NURSE PRACTITIONER

## 2023-07-05 ENCOUNTER — OFFICE VISIT (OUTPATIENT)
Dept: FAMILY MEDICINE | Facility: CLINIC | Age: 58
End: 2023-07-05
Payer: MEDICARE

## 2023-07-05 VITALS
TEMPERATURE: 98 F | BODY MASS INDEX: 35.9 KG/M2 | WEIGHT: 202.63 LBS | HEIGHT: 63 IN | HEART RATE: 102 BPM | DIASTOLIC BLOOD PRESSURE: 70 MMHG | SYSTOLIC BLOOD PRESSURE: 110 MMHG | OXYGEN SATURATION: 94 %

## 2023-07-05 DIAGNOSIS — E11.9 TYPE 2 DIABETES MELLITUS WITHOUT COMPLICATION, WITHOUT LONG-TERM CURRENT USE OF INSULIN: Primary | ICD-10-CM

## 2023-07-05 DIAGNOSIS — E78.2 MIXED HYPERLIPIDEMIA: ICD-10-CM

## 2023-07-05 DIAGNOSIS — E55.9 VITAMIN D DEFICIENCY: ICD-10-CM

## 2023-07-05 PROBLEM — R73.01 IMPAIRED FASTING GLUCOSE: Status: RESOLVED | Noted: 2023-04-05 | Resolved: 2023-07-05

## 2023-07-05 PROBLEM — Z78.9 STATIN INTOLERANCE: Status: RESOLVED | Noted: 2023-04-05 | Resolved: 2023-07-05

## 2023-07-05 PROCEDURE — 99214 OFFICE O/P EST MOD 30 MIN: CPT | Mod: ,,, | Performed by: NURSE PRACTITIONER

## 2023-07-05 PROCEDURE — 3008F BODY MASS INDEX DOCD: CPT | Mod: CPTII,,, | Performed by: NURSE PRACTITIONER

## 2023-07-05 PROCEDURE — 3078F PR MOST RECENT DIASTOLIC BLOOD PRESSURE < 80 MM HG: ICD-10-PCS | Mod: CPTII,,, | Performed by: NURSE PRACTITIONER

## 2023-07-05 PROCEDURE — 3074F SYST BP LT 130 MM HG: CPT | Mod: CPTII,,, | Performed by: NURSE PRACTITIONER

## 2023-07-05 PROCEDURE — 3078F DIAST BP <80 MM HG: CPT | Mod: CPTII,,, | Performed by: NURSE PRACTITIONER

## 2023-07-05 PROCEDURE — 1160F RVW MEDS BY RX/DR IN RCRD: CPT | Mod: CPTII,,, | Performed by: NURSE PRACTITIONER

## 2023-07-05 PROCEDURE — 99214 PR OFFICE/OUTPT VISIT, EST, LEVL IV, 30-39 MIN: ICD-10-PCS | Mod: ,,, | Performed by: NURSE PRACTITIONER

## 2023-07-05 PROCEDURE — 3074F PR MOST RECENT SYSTOLIC BLOOD PRESSURE < 130 MM HG: ICD-10-PCS | Mod: CPTII,,, | Performed by: NURSE PRACTITIONER

## 2023-07-05 PROCEDURE — 1160F PR REVIEW ALL MEDS BY PRESCRIBER/CLIN PHARMACIST DOCUMENTED: ICD-10-PCS | Mod: CPTII,,, | Performed by: NURSE PRACTITIONER

## 2023-07-05 PROCEDURE — 3008F PR BODY MASS INDEX (BMI) DOCUMENTED: ICD-10-PCS | Mod: CPTII,,, | Performed by: NURSE PRACTITIONER

## 2023-07-05 PROCEDURE — 1159F MED LIST DOCD IN RCRD: CPT | Mod: CPTII,,, | Performed by: NURSE PRACTITIONER

## 2023-07-05 PROCEDURE — 1159F PR MEDICATION LIST DOCUMENTED IN MEDICAL RECORD: ICD-10-PCS | Mod: CPTII,,, | Performed by: NURSE PRACTITIONER

## 2023-07-05 RX ORDER — HYDROXYZINE PAMOATE 25 MG/1
25 CAPSULE ORAL 4 TIMES DAILY
COMMUNITY
End: 2023-12-05

## 2023-07-05 RX ORDER — CALCIPOTRIENE 50 UG/G
1 CREAM TOPICAL 2 TIMES DAILY
COMMUNITY
Start: 2023-07-04

## 2023-07-05 NOTE — PROGRESS NOTES
Patient ID: Ricardo Daley  : 1965    Chief Complaint: COPD (Follow up)    Allergies: Patient is allergic to latex, lincocin [lincomycin], penicillins, olga-24 [theophylline], and clobetasol propionate.     History of Present Illness:  The patient is a 58 y.o. White female who presents to clinic for follow up on COPD (Follow up)     Here today for three-month follow-up on diabetes.  Doing well in general.  A1c 6.7% initially and now 6.6%.  Has always refused diabetic medication initiation.  Has continued to try and implement lifestyle modifications. Her weight is up 13# since last seen 3 months ago. This is despite the fact that she is making dietary adjustments. She has still been drinking juices and sweet tea. She does not check her blood glucose levels.     Has seen Dr Webb recently and had PFTs done. She tells me that her lung function has improved. She is doing well on Breztri.     Social History:  reports that she has been smoking cigarettes. She started smoking about 40 years ago. She has been smoking an average of .25 packs per day. She has been exposed to tobacco smoke. She has never used smokeless tobacco. She reports that she does not currently use alcohol. She reports that she does not use drugs.    Past Medical History:  has a past medical history of Carpal tunnel syndrome, Chronic bronchitis with wheezing, Chronic lumbar radiculopathy, COPD (chronic obstructive pulmonary disease), Family history of early CAD, Hyperlipidemia, IFG (impaired fasting glucose), Psoriasis, and Statin intolerance.    Current Medications:  Current Outpatient Medications   Medication Instructions    albuterol (PROVENTIL) 2.5 mg, Nebulization, Every 6 hours PRN, Rescue    albuterol (PROVENTIL/VENTOLIN HFA) 90 mcg/actuation inhaler 2 puffs, Inhalation, Every 6 hours, Rescue    amLODIPine (NORVASC) 5 mg, Oral    benzonatate (TESSALON) 200 mg, Oral    BREZTRI AEROSPHERE 160-9-4.8 mcg/actuation HFAA 2 puffs, Inhalation, 2  "times daily    calcipotriene (DOVONOX) 0.005 % cream 1 application, Topical (Top), 2 times daily    ergocalciferol (ERGOCALCIFEROL) 50,000 unit Cap TAKE 1 CAPSULE BY MOUTH EVERY 7 DAYS    fluticasone propionate (FLONASE) 100 mcg, Each Nostril, Daily    hydrOXYzine pamoate (VISTARIL) 25 mg, Oral, 4 times daily    ipratropium (ATROVENT) 0.02 % nebulizer solution SMARTSI Vial(s) Via Nebulizer 4 Times Daily PRN    mupirocin (BACTROBAN) 2 % ointment SMARTSIG:Sparingly Topical 3 Times Daily    nystatin (MYCOSTATIN) 400,000 Units, Oral, 4 times daily with meals & nightly    rosuvastatin (CRESTOR) 20 mg, Oral    triamcinolone acetonide 0.1% (KENALOG) 0.1 % ointment Topical (Top), 2 times daily       Review of Systems   See HPI    Visit Vitals  /70 (BP Location: Left arm, Patient Position: Sitting)   Pulse 102   Temp 97.5 °F (36.4 °C) (Temporal)   Ht 5' 3" (1.6 m)   Wt 91.9 kg (202 lb 9.6 oz)   SpO2 (!) 94%   BMI 35.89 kg/m²       Physical Exam  Vitals reviewed.   Constitutional:       Appearance: Normal appearance.   Cardiovascular:      Heart sounds: Normal heart sounds.   Pulmonary:      Breath sounds: Normal breath sounds.   Skin:     General: Skin is warm and dry.   Neurological:      Mental Status: She is oriented to person, place, and time.          Labs Reviewed:  Chemistry:  Lab Results   Component Value Date     2023    K 4.1 2023    CHLORIDE 102 2023    BUN 33.0 (H) 2023    CREATININE 1.15 2023    EGFRNORACEVR 55 2023    GLUCOSE 167 (H) 2023    CALCIUM 9.1 2023    ALKPHOS 82 2023    LABPROT 7.0 2023    ALBUMIN 4.2 2023    AST 27 2023    ALT 31 2023    [id number] 26.0 (L) 2023    TSH 1.56 2021        Lab Results   Component Value Date    HGBA1C 6.6 (H) 2023        Lipid Panel:  Lab Results   Component Value Date    CHOL 149 2023    HDL 42 2023    DLDL 86.7 2023    TRIG 129 2023    "     Assessment & Plan:  1. Type 2 diabetes mellitus without complication, without long-term current use of insulin   A1c 6.6%  Follow American Diabetic Association (ADA) dietary guidelines for eating and implement exercise into your daily regimen.   Check your glucose levels each morning and record for review at follow up.    -     Comprehensive Metabolic Panel; Future; Expected date: 10/05/2023  -     Hemoglobin A1C; Future; Expected date: 10/05/2023  -     Vitamin D; Future; Expected date: 10/05/2023    2. Vitamin D deficiency  -     Vitamin D; Future; Expected date: 10/05/2023    3. Mixed hyperlipidemia  Lab Results   Component Value Date    CHOL 149 06/28/2023    HDL 42 06/28/2023    DLDL 86.7 06/28/2023    TRIG 129 06/28/2023     Continue Rosuvastatin 20 mg daily.   LDL Goal: 100  Educated on dietary modifications. Follow a low cholesterol, low saturated fat diet with less that 200mg of cholesterol a day.  Avoid fried foods and high saturated fats.  Increase dietary fiber.  Regular exercise can reduce LDL (bad cholesterol) and raise HDL (good cholesterol). Encourage physical activity 5 times per week for 30 minutes per day.        Future Appointments   Date Time Provider Department Center   10/3/2023  8:00 AM Sagar Arnett MD [facility]   10/4/2023  9:00 AM LAB, [facility] KEREN [place]   10/12/2023  2:30 PM PEYTON U[place]       Follow up in about 3 months (around 10/5/2023) for DM, labs prior. Call sooner if needed.    PEYTON Ying

## 2023-07-26 DIAGNOSIS — J31.0 CHRONIC RHINITIS: Primary | ICD-10-CM

## 2023-07-26 DIAGNOSIS — J44.9 CHRONIC OBSTRUCTIVE PULMONARY DISEASE, UNSPECIFIED COPD TYPE: ICD-10-CM

## 2023-07-26 RX ORDER — ALBUTEROL SULFATE 90 UG/1
AEROSOL, METERED RESPIRATORY (INHALATION)
Qty: 77 G | Refills: 3 | Status: SHIPPED | OUTPATIENT
Start: 2023-07-26 | End: 2023-10-18 | Stop reason: SDUPTHER

## 2023-07-26 RX ORDER — BUDESONIDE, GLYCOPYRROLATE, AND FORMOTEROL FUMARATE 160; 9; 4.8 UG/1; UG/1; UG/1
AEROSOL, METERED RESPIRATORY (INHALATION)
Qty: 17.7 G | Refills: 3 | Status: SHIPPED | OUTPATIENT
Start: 2023-07-26 | End: 2023-07-27 | Stop reason: SDUPTHER

## 2023-07-26 RX ORDER — FLUTICASONE PROPIONATE 50 MCG
2 SPRAY, SUSPENSION (ML) NASAL DAILY
Qty: 48 G | Refills: 11 | Status: SHIPPED | OUTPATIENT
Start: 2023-07-26

## 2023-07-27 DIAGNOSIS — J44.9 CHRONIC OBSTRUCTIVE PULMONARY DISEASE, UNSPECIFIED COPD TYPE: ICD-10-CM

## 2023-07-27 RX ORDER — ALBUTEROL SULFATE 0.83 MG/ML
2.5 SOLUTION RESPIRATORY (INHALATION) EVERY 6 HOURS PRN
Qty: 3 ML | Refills: 11 | Status: SHIPPED | OUTPATIENT
Start: 2023-07-27 | End: 2023-12-05

## 2023-07-27 RX ORDER — BUDESONIDE, GLYCOPYRROLATE, AND FORMOTEROL FUMARATE 160; 9; 4.8 UG/1; UG/1; UG/1
2 AEROSOL, METERED RESPIRATORY (INHALATION) 2 TIMES DAILY
Qty: 17.7 G | Refills: 3 | Status: SHIPPED | OUTPATIENT
Start: 2023-07-27 | End: 2023-09-05

## 2023-09-05 DIAGNOSIS — E11.9 TYPE 2 DIABETES MELLITUS WITHOUT COMPLICATION, WITHOUT LONG-TERM CURRENT USE OF INSULIN: Primary | ICD-10-CM

## 2023-09-05 DIAGNOSIS — B37.0 THRUSH: ICD-10-CM

## 2023-09-05 DIAGNOSIS — J44.9 CHRONIC OBSTRUCTIVE PULMONARY DISEASE, UNSPECIFIED COPD TYPE: ICD-10-CM

## 2023-09-05 RX ORDER — LANCETS 33 GAUGE
EACH MISCELLANEOUS
Qty: 200 EACH | Refills: 3 | Status: SHIPPED | OUTPATIENT
Start: 2023-09-05

## 2023-09-05 RX ORDER — BUDESONIDE, GLYCOPYRROLATE, AND FORMOTEROL FUMARATE 160; 9; 4.8 UG/1; UG/1; UG/1
2 AEROSOL, METERED RESPIRATORY (INHALATION) DAILY
Qty: 10.7 G | Refills: 11 | Status: SHIPPED | OUTPATIENT
Start: 2023-09-05

## 2023-09-05 RX ORDER — NYSTATIN 100000 [USP'U]/ML
4 SUSPENSION ORAL
Qty: 473 ML | Refills: 0 | Status: SHIPPED | OUTPATIENT
Start: 2023-09-05 | End: 2023-09-26

## 2023-09-05 RX ORDER — DEXTROSE 4 G
TABLET,CHEWABLE ORAL
Qty: 1 EACH | Refills: 0 | Status: SHIPPED | OUTPATIENT
Start: 2023-09-05 | End: 2023-12-05

## 2023-09-05 RX ORDER — ISOPROPYL ALCOHOL 70 ML/100ML
1 SWAB TOPICAL DAILY
Qty: 200 EACH | Refills: 3 | Status: SHIPPED | OUTPATIENT
Start: 2023-09-05

## 2023-09-26 DIAGNOSIS — B37.0 THRUSH: ICD-10-CM

## 2023-09-26 RX ORDER — NYSTATIN 100000 [USP'U]/ML
SUSPENSION ORAL
Qty: 473 ML | Refills: 0 | Status: SHIPPED | OUTPATIENT
Start: 2023-09-26 | End: 2023-11-09

## 2023-09-27 ENCOUNTER — PATIENT MESSAGE (OUTPATIENT)
Dept: FAMILY MEDICINE | Facility: CLINIC | Age: 58
End: 2023-09-27
Payer: MEDICARE

## 2023-10-04 PROCEDURE — 82306 VITAMIN D 25 HYDROXY: CPT | Performed by: NURSE PRACTITIONER

## 2023-10-04 PROCEDURE — 80053 COMPREHEN METABOLIC PANEL: CPT | Performed by: NURSE PRACTITIONER

## 2023-10-04 PROCEDURE — 83036 HEMOGLOBIN GLYCOSYLATED A1C: CPT | Performed by: NURSE PRACTITIONER

## 2023-10-12 ENCOUNTER — TELEPHONE (OUTPATIENT)
Dept: FAMILY MEDICINE | Facility: CLINIC | Age: 58
End: 2023-10-12

## 2023-10-12 ENCOUNTER — OFFICE VISIT (OUTPATIENT)
Dept: FAMILY MEDICINE | Facility: CLINIC | Age: 58
End: 2023-10-12
Payer: MEDICARE

## 2023-10-12 VITALS
HEART RATE: 69 BPM | HEIGHT: 63 IN | WEIGHT: 184 LBS | TEMPERATURE: 97 F | DIASTOLIC BLOOD PRESSURE: 68 MMHG | SYSTOLIC BLOOD PRESSURE: 130 MMHG | BODY MASS INDEX: 32.6 KG/M2 | OXYGEN SATURATION: 98 %

## 2023-10-12 DIAGNOSIS — E11.9 TYPE 2 DIABETES MELLITUS WITHOUT COMPLICATION, WITHOUT LONG-TERM CURRENT USE OF INSULIN: ICD-10-CM

## 2023-10-12 DIAGNOSIS — Z53.20 CERVICAL CANCER SCREENING DECLINED: ICD-10-CM

## 2023-10-12 DIAGNOSIS — Z12.31 ENCOUNTER FOR SCREENING MAMMOGRAM FOR MALIGNANT NEOPLASM OF BREAST: ICD-10-CM

## 2023-10-12 DIAGNOSIS — J44.9 CHRONIC OBSTRUCTIVE PULMONARY DISEASE, UNSPECIFIED COPD TYPE: ICD-10-CM

## 2023-10-12 DIAGNOSIS — Z00.00 MEDICARE ANNUAL WELLNESS VISIT, SUBSEQUENT: Primary | ICD-10-CM

## 2023-10-12 DIAGNOSIS — I10 PRIMARY HYPERTENSION: ICD-10-CM

## 2023-10-12 DIAGNOSIS — E78.2 MIXED HYPERLIPIDEMIA: ICD-10-CM

## 2023-10-12 PROCEDURE — 3044F HG A1C LEVEL LT 7.0%: CPT | Mod: ,,, | Performed by: NURSE PRACTITIONER

## 2023-10-12 PROCEDURE — 1160F RVW MEDS BY RX/DR IN RCRD: CPT | Mod: ,,, | Performed by: NURSE PRACTITIONER

## 2023-10-12 PROCEDURE — 3061F PR NEG MICROALBUMINURIA RESULT DOCUMENTED/REVIEW: ICD-10-PCS | Mod: ,,, | Performed by: NURSE PRACTITIONER

## 2023-10-12 PROCEDURE — 1159F PR MEDICATION LIST DOCUMENTED IN MEDICAL RECORD: ICD-10-PCS | Mod: ,,, | Performed by: NURSE PRACTITIONER

## 2023-10-12 PROCEDURE — 3075F SYST BP GE 130 - 139MM HG: CPT | Mod: ,,, | Performed by: NURSE PRACTITIONER

## 2023-10-12 PROCEDURE — G0439 PPPS, SUBSEQ VISIT: HCPCS | Mod: ,,, | Performed by: NURSE PRACTITIONER

## 2023-10-12 PROCEDURE — 1160F PR REVIEW ALL MEDS BY PRESCRIBER/CLIN PHARMACIST DOCUMENTED: ICD-10-PCS | Mod: ,,, | Performed by: NURSE PRACTITIONER

## 2023-10-12 PROCEDURE — 3066F NEPHROPATHY DOC TX: CPT | Mod: ,,, | Performed by: NURSE PRACTITIONER

## 2023-10-12 PROCEDURE — 3061F NEG MICROALBUMINURIA REV: CPT | Mod: ,,, | Performed by: NURSE PRACTITIONER

## 2023-10-12 PROCEDURE — 3044F PR MOST RECENT HEMOGLOBIN A1C LEVEL <7.0%: ICD-10-PCS | Mod: ,,, | Performed by: NURSE PRACTITIONER

## 2023-10-12 PROCEDURE — G0439 PR MEDICARE ANNUAL WELLNESS SUBSEQUENT VISIT: ICD-10-PCS | Mod: ,,, | Performed by: NURSE PRACTITIONER

## 2023-10-12 PROCEDURE — 82043 UR ALBUMIN QUANTITATIVE: CPT | Performed by: NURSE PRACTITIONER

## 2023-10-12 PROCEDURE — 1159F MED LIST DOCD IN RCRD: CPT | Mod: ,,, | Performed by: NURSE PRACTITIONER

## 2023-10-12 PROCEDURE — 3075F PR MOST RECENT SYSTOLIC BLOOD PRESS GE 130-139MM HG: ICD-10-PCS | Mod: ,,, | Performed by: NURSE PRACTITIONER

## 2023-10-12 PROCEDURE — 3066F PR DOCUMENTATION OF TREATMENT FOR NEPHROPATHY: ICD-10-PCS | Mod: ,,, | Performed by: NURSE PRACTITIONER

## 2023-10-12 PROCEDURE — 3078F DIAST BP <80 MM HG: CPT | Mod: ,,, | Performed by: NURSE PRACTITIONER

## 2023-10-12 PROCEDURE — 3078F PR MOST RECENT DIASTOLIC BLOOD PRESSURE < 80 MM HG: ICD-10-PCS | Mod: ,,, | Performed by: NURSE PRACTITIONER

## 2023-10-12 NOTE — TELEPHONE ENCOUNTER
NO, this medication is only meant to be taken once weekly.  We usually do this high dose once weekly for about 3 months and then switch people over to the over-the-counter vitamin-D

## 2023-10-12 NOTE — PROGRESS NOTES
Patient ID: Ricardo Daley  : 1965    Chief Complaint: Medicare AWV (Wellness)      History of Present Illness:  The patient is a 58 y.o. White female who presents to clinic for annual wellness visit.      Here today for annual wellness.  Doing well in general.  She is diabetic; A1c 6.6% initially and now 6.2%.  Has always refused diabetic medication initiation.  Has continued to try and implement lifestyle modifications. She has lost 18# in the last few months. She does check her blood glucose levels pretty regularly; she mostly does this to track the effect of certain foods/meals on her blood sugar.      Has seen Dr Webb recently and had PFTs done. She tells me that her lung function has improved. She is doing well on Breztri.        Allergies: Patient is allergic to latex, lincocin [lincomycin], penicillins, olga-24 [theophylline], and clobetasol propionate.     Past Medical History:  has a past medical history of Carpal tunnel syndrome, Chronic bronchitis with wheezing, Chronic lumbar radiculopathy, COPD (chronic obstructive pulmonary disease), Family history of early CAD, Hyperlipidemia, IFG (impaired fasting glucose), Psoriasis, and Statin intolerance.    Surgical History:  has a past surgical history that includes Breast surgery (Right); Cholecystectomy; and Hernia repair.    Family History: family history includes Diabetes in her mother; Heart disease in her mother; Stroke in her mother.    Social History:  reports that she has been smoking cigarettes. She started smoking about 40 years ago. She has a 10.2 pack-year smoking history. She has been exposed to tobacco smoke. She has never used smokeless tobacco. She reports that she does not currently use alcohol. She reports that she does not use drugs.    Care Team: Patient Care Team:  Deisi Byrd FNP-C as PCP - General (Family Medicine)  Obed Webb MD (Pulmonary Disease)  Lennox Blancas FNP (Family Medicine)  Sagar Arnett MD  as Consulting Physician (Otolaryngology)  Ron Deleon MD as Consulting Physician (General Surgery)  Eladio Esposito MD as Consulting Physician (Cardiology)  Kelsey Meadows OD (Optometry)     Current Medications:  Current Outpatient Medications   Medication Instructions    albuterol (PROVENTIL) 2.5 mg, Nebulization, Every 6 hours PRN, Rescue    albuterol (PROVENTIL/VENTOLIN HFA) 90 mcg/actuation inhaler UNKNOWN    alcohol swabs (BD ALCOHOL SWABS) PadM 1 each, Topical (Top), Daily    amLODIPine (NORVASC) 5 mg, Oral    benzonatate (TESSALON) 200 mg, Oral    blood sugar diagnostic (TRUE METRIX GLUCOSE TEST STRIP) Strp Use as directed to check blood glucose once daily    blood-glucose meter (TRUE METRIX AIR GLUCOSE METER) Misc Use as directed to check blood glucose once daily    budesonide-glycopyr-formoterol (BREZTRI AEROSPHERE) 160-9-4.8 mcg/actuation HFAA 2 puffs, Inhalation, Daily    calcipotriene (DOVONOX) 0.005 % cream 1 application , Topical (Top), 2 times daily    ergocalciferol (ERGOCALCIFEROL) 50,000 unit Cap TAKE 1 CAPSULE BY MOUTH EVERY 7 DAYS    fluticasone propionate (FLONASE) 100 mcg, Each Nostril, Daily    hydrOXYzine pamoate (VISTARIL) 25 mg, Oral, 4 times daily    ipratropium (ATROVENT) 0.02 % nebulizer solution SMARTSI Vial(s) Via Nebulizer 4 Times Daily PRN    lancets (TRUEPLUS LANCETS) 33 gauge Misc Use as directed to check blood glucose once daily    mupirocin (BACTROBAN) 2 % ointment SMARTSIG:Sparingly Topical 3 Times Daily    nystatin (MYCOSTATIN) 100,000 unit/mL suspension TAKE 4 ML (400,000 UNITS TOTAL) BY MOUTH 4 (FOUR) TIMES DAILY WITH MEALS AND NIGHTLY.    rosuvastatin (CRESTOR) 20 mg, Oral    triamcinolone acetonide 0.1% (KENALOG) 0.1 % ointment Topical (Top), 2 times daily       Opioid Screening: Patient medication list reviewed, patient is not taking prescription opioids. Patient is not using additional opioids than prescribed. Patient is at low risk of substance abuse based  on this opioid use history.     Patient Reported Health Risk Assessment  Are you male or female?: Female  During the past four weeks, how much have you been bothered by emotional problems such as feeling anxious, depressed, irritable, sad, or downhearted and blue?: Not at all  During the past five weeks, has your physical and/or emotional health limited your social activities with family, friends, neighbors, or groups?: Not at all  During the past four weeks, how much bodily pain have you generally had?: No pain  During the past four weeks, was someone available to help if you needed and wanted help?: No, not at all  During the past four weeks, what was the hardest physical activity you could do for at least two minutes?: Light  Can you get to places out of walking distance without help?  (For example, can you travel alone on buses or taxis, or drive your own car?): Yes  Can you go shopping for groceries or clothes without someone's help?: Yes  Can you prepare your own meals?: Yes  Can you do your own housework without help?: Yes  Because of any health problems, do you need the help of another person with your personal care needs such as eating, bathing, dressing, or getting around the house?: No  Can you handle your own money without help?: Yes  During the past four weeks, how would you rate your health in general?: Very good  How have things been going for you during the past four weeks?: Very well  Are you having difficulties driving your car?: No  Do you always fasten your seat belt when you are in a car?: Yes, usually  How often in the past four weeks have you been bothered by falling or dizzy when standing up?: Never  How often in the past four weeks have you been bothered by sexual problems?: Never  How often in the past four weeks have you been bothered by trouble eating well?: Never  How often in the past four weeks have you been bothered by teeth or denture problems?: Never  How often in the past four  weeks have you been bothered with problems using the telephone?: Never  How often in the past four weeks have you been bothered by tiredness or fatigue?: Never  Have you fallen two or more times in the past year?: No  Are you afraid of falling?: No  Are you a smoker?: Yes, I'm not ready to quit  During the past four weeks, how many drinks of wine, beer, or other alcoholic beverages did you have?: No alcohol at all  Do you exercise for about 20 minutes three or more days a week?: Yes, some of the time  Have you been given any information to help you with hazards in your house that might hurt you?: No  Have you been given any information to help you with keeping track of your medications?: Yes  How often do you have trouble taking medicines the way you've been told to take them?: I always take them as prescribed  How confident are you that you can control and manage most of your health problems?: Very confident  What is your race? (Check all that apply.):     Review of Systems   Constitutional:  Negative for activity change, appetite change, fatigue and unexpected weight change.   HENT:  Negative for ear pain and hearing loss.    Eyes:  Negative for visual disturbance.   Respiratory:  Negative for apnea, cough, chest tightness, shortness of breath and wheezing.    Cardiovascular:  Negative for chest pain, palpitations, leg swelling and claudication.   Gastrointestinal:  Negative for abdominal pain, anal bleeding, blood in stool, change in bowel habit, nausea, vomiting and reflux.   Endocrine: Negative for cold intolerance, heat intolerance, polydipsia, polyphagia and polyuria.   Genitourinary:  Negative for dysuria, frequency, hematuria and urgency.   Musculoskeletal:  Negative for arthralgias.   Integumentary:  Negative for rash and mole/lesion.   Allergic/Immunologic: Negative for environmental allergies.   Neurological:  Negative for dizziness, headaches and memory loss.        Visit Vitals  /68 (BP  "Location: Left arm)   Pulse 69   Temp 97 °F (36.1 °C) (Temporal)   Ht 5' 3" (1.6 m)   Wt 83.5 kg (184 lb)   SpO2 98%   BMI 32.59 kg/m²       Physical Exam  Vitals reviewed.   Constitutional:       Appearance: Normal appearance. She is normal weight.   HENT:      Right Ear: Tympanic membrane, ear canal and external ear normal.      Left Ear: Tympanic membrane, ear canal and external ear normal.      Nose: Nose normal.      Mouth/Throat:      Mouth: Mucous membranes are moist.      Pharynx: Oropharynx is clear.   Eyes:      Conjunctiva/sclera: Conjunctivae normal.   Cardiovascular:      Rate and Rhythm: Normal rate and regular rhythm.      Pulses: Normal pulses.      Heart sounds: Normal heart sounds.   Pulmonary:      Effort: Pulmonary effort is normal.      Breath sounds: Normal breath sounds.   Abdominal:      General: Bowel sounds are normal.      Palpations: Abdomen is soft.   Musculoskeletal:         General: Normal range of motion.      Cervical back: Normal range of motion and neck supple.   Skin:     General: Skin is warm and dry.      Capillary Refill: Capillary refill takes less than 2 seconds.   Neurological:      Mental Status: She is alert and oriented to person, place, and time.   Psychiatric:         Mood and Affect: Mood normal.         Behavior: Behavior normal.                   No data to display                  9/29/2022     8:00 AM   Fall Risk Assessment - Outpatient   Mobility Status Ambulatory   Number of falls 0   Identified as fall risk False           Depression Screening  Over the past two weeks, has the patient felt down, depressed, or hopeless?: No  Over the past two weeks, has the patient felt little interest or pleasure in doing things?: No  Functional Ability/Safety Screening  Was the patient's timed Up & Go test unsteady or longer than 30 seconds?: No  Does the patient need help with phone, transportation, shopping, preparing meals, housework, laundry, meds, or managing money?: " No  Does the patient's home have rugs in the hallway, lack grab bars in the bathroom, lack handrails on the stairs or have poor lighting?: No  Have you noticed any hearing difficulties?: No  Cognitive Function (Assessed through direct observation with due consideration of information obtained by way of patient reports and/or concerns raised by family, friends, caretakers, or others)    Does the patient repeat questions/statements in the same day?: No  Does the patient have trouble remembering the date, year, and time?: No  Does the patient have difficulty managing finances?: No  Does the patient have a decreased sense of direction?: No    Labs Reviewed:  Chemistry:  Lab Results   Component Value Date     10/04/2023    K 4.0 10/04/2023    CHLORIDE 104 10/04/2023    BUN 20.0 10/04/2023    CREATININE 0.89 10/04/2023    EGFRNORACEVR 75 10/04/2023    GLUCOSE 102 10/04/2023    CALCIUM 9.4 10/04/2023    ALKPHOS 80 10/04/2023    LABPROT 6.8 10/04/2023    ALBUMIN 4.2 10/04/2023    AST 35 10/04/2023    ALT 39 10/04/2023    SPHFJRWY18SQ 49.4 10/04/2023    TSH 1.56 12/30/2021        Lab Results   Component Value Date    HGBA1C 6.2 (H) 10/04/2023        Hematology:  Lab Results   Component Value Date    WBC 8.5 03/28/2023    RBC 5.41 (H) 03/28/2023    HGB 14.0 03/28/2023    HCT 44.2 03/28/2023    MCV 81.7 03/28/2023    MCH 25.9 (L) 03/28/2023    MCHC 31.7 03/28/2023    RDW 13.8 03/28/2023     03/28/2023    MPV 9.6 03/28/2023       Lipid Panel:  Lab Results   Component Value Date    CHOL 149 06/28/2023    HDL 42 06/28/2023    DLDL 86.7 06/28/2023    TRIG 129 06/28/2023        Assessment & Plan:  1. Medicare annual wellness visit, subsequent    2. Type 2 diabetes mellitus without complication, without long-term current use of insulin  Overview:  Diet controlled.     Orders:  -     blood sugar diagnostic (TRUE METRIX GLUCOSE TEST STRIP) Strp; Use as directed to check blood glucose once daily  Dispense: 200 strip;  Refill: 0  -     Microalbumin/Creatinine Ratio, Urine    3. Primary hypertension  Overview:  On Amlodipine 5 mg daily    Assessment & Plan:  Well controlled.   Continue  Low Sodium Diet (DASH Diet - Less than 2 grams of sodium per day).  Monitor blood pressure daily and log. Report consistent numbers greater than 140/90.  Maintain healthy weight with goal BMI <30. Exercise 30 minutes per day, 5 days per week.  Smoking cessation encouraged to aid in BP reduction.        4. Mixed hyperlipidemia  Overview:  On Rosuvastatin 20 mg daily.     Assessment & Plan:  Lab Results   Component Value Date    CHOL 149 06/28/2023    HDL 42 06/28/2023    DLDL 86.7 06/28/2023    TRIG 129 06/28/2023     LDL Goal: 100  Educated on dietary modifications. Follow a low cholesterol, low saturated fat diet with less that 200mg of cholesterol a day.  Avoid fried foods and high saturated fats.  Increase dietary fiber.  Regular exercise can reduce LDL (bad cholesterol) and raise HDL (good cholesterol). Encourage physical activity 5 times per week for 30 minutes per day.       5. Chronic obstructive pulmonary disease, unspecified COPD type  Overview:  On Breztri daily.      6. Encounter for screening mammogram for malignant neoplasm of breast  -     Mammo Digital Screening Bilat w/ Mario; Future; Expected date: 10/12/2023    7. Cervical cancer screening declined         Cervical Cancer Screening- declines  Breast Cancer Screening-  past due  Colon Cancer Screening -  UTD  Eye Exam- Recommend annually.  Dental Exam- Recommend biannually.    Vaccinations:    There is no immunization history on file for this patient.    Oder annual and 6 mo dm    Follow up in about 1 year (around 10/12/2024) for wellness, labs prior; PLUS 6 months DM f/u with labs prior. Call sooner if needed.    Deisi Byrd, FNP-C         Medicare Annual Wellness and Personalized Prevention Plan:   Fall Risk + Home Safety + Hearing Impairment + Depression Screen + Opioid and  Substance Abuse Screening + Cognitive Impairment Screen + Health Risk Assessment all reviewed.     Health Maintenance Topics with due status: Not Due       Topic Last Completion Date    Colorectal Cancer Screening 12/30/2021    Lipid Panel 06/28/2023    Hemoglobin A1c 10/04/2023    Low Dose Statin 10/12/2023    Diabetes Urine Screening 10/12/2023      The patient's Health Maintenance was reviewed and the following appears to be due at this time:   Health Maintenance Due   Topic Date Due    Hepatitis C Screening  Never done    Cervical Cancer Screening  Never done    COVID-19 Vaccine (1) Never done    Pneumococcal Vaccines (Age 0-64) (1 - PCV) Never done    Foot Exam  Never done    Eye Exam  Never done    HIV Screening  Never done    TETANUS VACCINE  Never done    Shingles Vaccine (1 of 2) Never done    Mammogram  01/13/2023    Influenza Vaccine (1) Never done       Advance Care Planning   I attest to discussing Advance Care Planning with patient and/or family member.  Education was provided including the importance of the Health Care Power of , Advance Directives, and/or [place] documentation.  The patient expressed understanding to the importance of this information and discussion.       Follow up in about 1 year (around 10/12/2024) for wellness, labs prior; PLUS 6 months DM f/u with labs prior. In addition to their scheduled follow up, the patient has also been instructed to follow up on as needed basis.

## 2023-10-13 ENCOUNTER — PATIENT OUTREACH (OUTPATIENT)
Dept: ADMINISTRATIVE | Facility: HOSPITAL | Age: 58
End: 2023-10-13
Payer: MEDICARE

## 2023-10-13 LAB
CREAT UR-MCNC: 98.3 MG/DL (ref 45–106)
MICROALBUMIN UR-MCNC: 6.1 UG/ML
MICROALBUMIN/CREAT RATIO PNL UR: 6.2 MG/GM CR (ref 0–30)

## 2023-10-13 NOTE — ASSESSMENT & PLAN NOTE
Well controlled.   Continue  Low Sodium Diet (DASH Diet - Less than 2 grams of sodium per day).  Monitor blood pressure daily and log. Report consistent numbers greater than 140/90.  Maintain healthy weight with goal BMI <30. Exercise 30 minutes per day, 5 days per week.  Smoking cessation encouraged to aid in BP reduction.

## 2023-10-13 NOTE — LETTER
AUTHORIZATION FOR RELEASE OF   CONFIDENTIAL INFORMATION    Dear [facility],  Fax: [phone]    We are seeing Ricardo Daley, date of birth 1965, in the clinic at [facility]. Deisi Byrd FNP-C is the patient's PCP. Ricardo Daley has an outstanding lab/procedure at the time we reviewed her chart. In order to help keep her health information updated, she has authorized us to request the following medical record(s):        (  )  MAMMOGRAM                                      (  )  COLONOSCOPY      (  )  PAP SMEAR                                          (  )  OUTSIDE LAB RESULTS     (  )  DEXA SCAN                                          ( x )  DIABETIC EYE EXAM            (  )  FOOT EXAM                                          (  )  ENTIRE RECORD     (  )  OUTSIDE IMMUNIZATIONS                 (  )  _______________         Please fax records to [facility], [place], [address] [place], [phone].           Patient Name: Ricardo Daley  : 1965  Patient Phone #: [phone]

## 2023-10-13 NOTE — ASSESSMENT & PLAN NOTE
Lab Results   Component Value Date    CHOL 149 06/28/2023    HDL 42 06/28/2023    DLDL 86.7 06/28/2023    TRIG 129 06/28/2023     LDL Goal: 100  Educated on dietary modifications. Follow a low cholesterol, low saturated fat diet with less that 200mg of cholesterol a day.  Avoid fried foods and high saturated fats.  Increase dietary fiber.  Regular exercise can reduce LDL (bad cholesterol) and raise HDL (good cholesterol). Encourage physical activity 5 times per week for 30 minutes per day.

## 2023-10-13 NOTE — PROGRESS NOTES
Population Health Chart Review & Patient Outreach Details:     Reason for Outreach Encounter:     [x]  Non-Compliant Report   []  Payor Report ([employer], PHN, BCBS, MSSP, MCIP, UHC, etc.)   []  Pre-Visit Chart Review     Updates Requested / Reviewed:     []  Care Everywhere    []     []  External Sources ([facility], [facility], DIS, etc.)   [x]  Care Team Updated    Patient Outreach Method:    []  Telephone Outreach Completed   [] Successful   [] Left Voicemail   [] Unable to Contact (wrong number, no voicemail)  []  [facility] Outreach Sent  []  Letter Outreach Mailed  [x]  Fax Sent for External Records  []  External Records Upload    Health Maintenance Topics Addressed and Outreach Outcomes / Actions Taken:        [x]      Diabetic Eye Exam []  Eye Camera Scheduled or Optometry Referral Placed     [x]  External Records Requested     []  Added Reminder to Complete to Upcoming Primary Care Appt Notes     []  Patient Declined     []  Patient Will Call Back to Schedule     []  Patient Will Schedule with External Provider     Requested notes from [facility].    Lacey Bhakta MA - Panel Care Coordinator

## 2023-10-13 NOTE — TELEPHONE ENCOUNTER
Pt called back and I notified her. She said that she went to three times weekly on her own for a month to see if it would come up and it did so she will take OTC vit D

## 2023-10-18 DIAGNOSIS — J44.9 CHRONIC OBSTRUCTIVE PULMONARY DISEASE, UNSPECIFIED COPD TYPE: ICD-10-CM

## 2023-10-18 RX ORDER — ALBUTEROL SULFATE 90 UG/1
1-2 AEROSOL, METERED RESPIRATORY (INHALATION) EVERY 6 HOURS PRN
Qty: 77 G | Refills: 3 | Status: SHIPPED | OUTPATIENT
Start: 2023-10-18

## 2023-10-19 ENCOUNTER — HOSPITAL ENCOUNTER (OUTPATIENT)
Dept: RADIOLOGY | Facility: HOSPITAL | Age: 58
Discharge: HOME | End: 2023-10-19
Attending: NURSE PRACTITIONER
Payer: MEDICARE

## 2023-10-19 DIAGNOSIS — Z12.31 ENCOUNTER FOR SCREENING MAMMOGRAM FOR MALIGNANT NEOPLASM OF BREAST: ICD-10-CM

## 2023-10-19 PROCEDURE — 77067 SCR MAMMO BI INCL CAD: CPT | Mod: TC

## 2023-11-09 DIAGNOSIS — B37.0 THRUSH: ICD-10-CM

## 2023-11-09 RX ORDER — NYSTATIN 100000 [USP'U]/ML
SUSPENSION ORAL
Qty: 473 ML | Refills: 10 | Status: SHIPPED | OUTPATIENT
Start: 2023-11-09

## 2023-12-05 DIAGNOSIS — E11.9 TYPE 2 DIABETES MELLITUS WITHOUT COMPLICATION, WITHOUT LONG-TERM CURRENT USE OF INSULIN: ICD-10-CM

## 2023-12-05 DIAGNOSIS — J44.9 CHRONIC OBSTRUCTIVE PULMONARY DISEASE, UNSPECIFIED COPD TYPE: ICD-10-CM

## 2023-12-05 DIAGNOSIS — J31.0 CHRONIC RHINITIS: Primary | ICD-10-CM

## 2023-12-05 DIAGNOSIS — J44.9 CHRONIC OBSTRUCTIVE PULMONARY DISEASE, UNSPECIFIED COPD TYPE: Primary | ICD-10-CM

## 2023-12-05 RX ORDER — BENZONATATE 200 MG/1
200 CAPSULE ORAL 2 TIMES DAILY PRN
Qty: 30 CAPSULE | Refills: 1 | Status: SHIPPED | OUTPATIENT
Start: 2023-12-05

## 2023-12-05 RX ORDER — HYDROXYZINE PAMOATE 25 MG/1
25 CAPSULE ORAL EVERY 6 HOURS PRN
Qty: 270 CAPSULE | Refills: 0 | Status: SHIPPED | OUTPATIENT
Start: 2023-12-05 | End: 2024-03-22

## 2023-12-05 RX ORDER — ALBUTEROL SULFATE 0.83 MG/ML
2.5 SOLUTION RESPIRATORY (INHALATION) 4 TIMES DAILY PRN
Qty: 60 EACH | Refills: 0 | Status: SHIPPED | OUTPATIENT
Start: 2023-12-05 | End: 2023-12-22

## 2023-12-05 RX ORDER — DIPHENHYDRAMINE HCL 25 MG
TABLET ORAL
Qty: 1 EACH | Refills: 3 | Status: SHIPPED | OUTPATIENT
Start: 2023-12-05 | End: 2024-02-15

## 2023-12-05 RX ORDER — BENZONATATE 200 MG/1
CAPSULE ORAL
Qty: 270 CAPSULE | Refills: 0 | OUTPATIENT
Start: 2023-12-05

## 2023-12-08 ENCOUNTER — CLINICAL SUPPORT (OUTPATIENT)
Dept: FAMILY MEDICINE | Facility: CLINIC | Age: 58
End: 2023-12-08
Payer: MEDICARE

## 2023-12-08 DIAGNOSIS — E11.9 TYPE 2 DIABETES MELLITUS WITHOUT COMPLICATION, WITHOUT LONG-TERM CURRENT USE OF INSULIN: Primary | ICD-10-CM

## 2023-12-08 NOTE — PROGRESS NOTES
Ricardo Daley is a 58 y.o. female here for a diabetic eye screening with non-dilated fundus photos per Deisi Byrd NP.    Patient cooperative?: Yes  Small pupils?: No  Last eye exam: Never    For exam results, see Encounter Report  Will Hyperlink once read.    Lacey Bhakta MA - Panel Care Coordinator

## 2023-12-11 LAB
LEFT EYE DM RETINOPATHY: NEGATIVE
RIGHT EYE DM RETINOPATHY: NEGATIVE

## 2023-12-20 ENCOUNTER — OFFICE VISIT (OUTPATIENT)
Dept: FAMILY MEDICINE | Facility: CLINIC | Age: 58
End: 2023-12-20
Payer: MEDICARE

## 2023-12-20 VITALS
HEART RATE: 97 BPM | OXYGEN SATURATION: 98 % | WEIGHT: 184.63 LBS | BODY MASS INDEX: 32.71 KG/M2 | TEMPERATURE: 98 F | DIASTOLIC BLOOD PRESSURE: 70 MMHG | HEIGHT: 63 IN | SYSTOLIC BLOOD PRESSURE: 132 MMHG

## 2023-12-20 DIAGNOSIS — E11.9 TYPE 2 DIABETES MELLITUS WITHOUT COMPLICATION, WITHOUT LONG-TERM CURRENT USE OF INSULIN: ICD-10-CM

## 2023-12-20 PROCEDURE — 3044F PR MOST RECENT HEMOGLOBIN A1C LEVEL <7.0%: ICD-10-PCS | Mod: ,,, | Performed by: NURSE PRACTITIONER

## 2023-12-20 PROCEDURE — 99213 OFFICE O/P EST LOW 20 MIN: CPT | Mod: ,,, | Performed by: NURSE PRACTITIONER

## 2023-12-20 PROCEDURE — 2023F PR DILATED RETINAL EXAM W/O EVID OF RETINOPATHY: ICD-10-PCS | Mod: ,,, | Performed by: NURSE PRACTITIONER

## 2023-12-20 PROCEDURE — 3075F PR MOST RECENT SYSTOLIC BLOOD PRESS GE 130-139MM HG: ICD-10-PCS | Mod: ,,, | Performed by: NURSE PRACTITIONER

## 2023-12-20 PROCEDURE — 3066F NEPHROPATHY DOC TX: CPT | Mod: ,,, | Performed by: NURSE PRACTITIONER

## 2023-12-20 PROCEDURE — 2023F DILAT RTA XM W/O RTNOPTHY: CPT | Mod: ,,, | Performed by: NURSE PRACTITIONER

## 2023-12-20 PROCEDURE — 99213 PR OFFICE/OUTPT VISIT, EST, LEVL III, 20-29 MIN: ICD-10-PCS | Mod: ,,, | Performed by: NURSE PRACTITIONER

## 2023-12-20 PROCEDURE — 3008F PR BODY MASS INDEX (BMI) DOCUMENTED: ICD-10-PCS | Mod: ,,, | Performed by: NURSE PRACTITIONER

## 2023-12-20 PROCEDURE — 3061F PR NEG MICROALBUMINURIA RESULT DOCUMENTED/REVIEW: ICD-10-PCS | Mod: ,,, | Performed by: NURSE PRACTITIONER

## 2023-12-20 PROCEDURE — 1160F PR REVIEW ALL MEDS BY PRESCRIBER/CLIN PHARMACIST DOCUMENTED: ICD-10-PCS | Mod: ,,, | Performed by: NURSE PRACTITIONER

## 2023-12-20 PROCEDURE — 1159F MED LIST DOCD IN RCRD: CPT | Mod: ,,, | Performed by: NURSE PRACTITIONER

## 2023-12-20 PROCEDURE — 3008F BODY MASS INDEX DOCD: CPT | Mod: ,,, | Performed by: NURSE PRACTITIONER

## 2023-12-20 PROCEDURE — 3078F PR MOST RECENT DIASTOLIC BLOOD PRESSURE < 80 MM HG: ICD-10-PCS | Mod: ,,, | Performed by: NURSE PRACTITIONER

## 2023-12-20 PROCEDURE — 3066F PR DOCUMENTATION OF TREATMENT FOR NEPHROPATHY: ICD-10-PCS | Mod: ,,, | Performed by: NURSE PRACTITIONER

## 2023-12-20 PROCEDURE — 3075F SYST BP GE 130 - 139MM HG: CPT | Mod: ,,, | Performed by: NURSE PRACTITIONER

## 2023-12-20 PROCEDURE — 3061F NEG MICROALBUMINURIA REV: CPT | Mod: ,,, | Performed by: NURSE PRACTITIONER

## 2023-12-20 PROCEDURE — 3044F HG A1C LEVEL LT 7.0%: CPT | Mod: ,,, | Performed by: NURSE PRACTITIONER

## 2023-12-20 PROCEDURE — 3078F DIAST BP <80 MM HG: CPT | Mod: ,,, | Performed by: NURSE PRACTITIONER

## 2023-12-20 PROCEDURE — 1159F PR MEDICATION LIST DOCUMENTED IN MEDICAL RECORD: ICD-10-PCS | Mod: ,,, | Performed by: NURSE PRACTITIONER

## 2023-12-20 PROCEDURE — 1160F RVW MEDS BY RX/DR IN RCRD: CPT | Mod: ,,, | Performed by: NURSE PRACTITIONER

## 2023-12-20 RX ORDER — POLYMYXIN B SULFATE AND TRIMETHOPRIM 1; 10000 MG/ML; [USP'U]/ML
1 SOLUTION OPHTHALMIC
COMMUNITY
Start: 2023-10-23

## 2023-12-20 NOTE — PROGRESS NOTES
Patient ID: Ricardo Daley  : 1965    Chief Complaint: medication discussion    Allergies: Patient is allergic to latex, lincocin [lincomycin], penicillins, olga-24 [theophylline], and clobetasol propionate.     History of Present Illness:  The patient is a 58 y.o. White female who presents to clinic for follow up on medication discussion     Here to ask for more glucose test strips; they were sent in October but she never got a refill. She did get a new machine and lancets. She has been checking her blood sugars more that several times daily; she reports good readings. She is doing well in general. No problems with medications and no health concerns currently.    Social History:  reports that she has been smoking cigarettes. She started smoking about 40 years ago. She has a 10.2 pack-year smoking history. She has been exposed to tobacco smoke. She has never used smokeless tobacco. She reports that she does not currently use alcohol. She reports that she does not use drugs.    Past Medical History:  has a past medical history of Carpal tunnel syndrome, Chronic bronchitis with wheezing, Chronic lumbar radiculopathy, COPD (chronic obstructive pulmonary disease), Family history of early CAD, Hyperlipidemia, IFG (impaired fasting glucose), Psoriasis, and Statin intolerance.    Current Medications:  Current Outpatient Medications   Medication Instructions    albuterol (PROVENTIL) 2.5 mg, Nebulization, 4 times daily PRN    albuterol (PROVENTIL/VENTOLIN HFA) 90 mcg/actuation inhaler 1-2 puffs, Inhalation, Every 6 hours PRN, Rescue    alcohol swabs (BD ALCOHOL SWABS) PadM 1 each, Topical (Top), Daily    amLODIPine (NORVASC) 5 mg, Oral    benzonatate (TESSALON) 200 mg, Oral, 2 times daily PRN    blood sugar diagnostic (TRUE METRIX GLUCOSE TEST STRIP) Strp Use as directed to check blood glucose once daily    blood-glucose meter (TRUE METRIX AIR GLUCOSE METER) Misc USE AS DIRECTED    budesonide-glycopyr-formoterol (BREZTRI  "AEROSPHERE) 160-9-4.8 mcg/actuation HFAA 2 puffs, Inhalation, Daily    calcipotriene (DOVONOX) 0.005 % cream 1 application , Topical (Top), 2 times daily    ergocalciferol (ERGOCALCIFEROL) 50,000 unit Cap TAKE 1 CAPSULE BY MOUTH EVERY 7 DAYS    fluticasone propionate (FLONASE) 100 mcg, Each Nostril, Daily    hydrOXYzine pamoate (VISTARIL) 25 mg, Oral, Every 6 hours PRN    ipratropium (ATROVENT) 0.02 % nebulizer solution SMARTSI Vial(s) Via Nebulizer 4 Times Daily PRN    lancets (TRUEPLUS LANCETS) 33 gauge Misc Use as directed to check blood glucose once daily    mupirocin (BACTROBAN) 2 % ointment SMARTSIG:Sparingly Topical 3 Times Daily    nystatin (MYCOSTATIN) 100,000 unit/mL suspension TAKE 4 ML FOUR TIMES DAILY WITH MEALS AND NIGHTLY    polymyxin B sulf-trimethoprim (POLYTRIM) 10,000 unit- 1 mg/mL Drop 1 drop, Both Eyes, Every 3 hours    rosuvastatin (CRESTOR) 20 mg, Oral    triamcinolone acetonide 0.1% (KENALOG) 0.1 % ointment Topical (Top), 2 times daily       Review of Systems   See HPI    Visit Vitals  /70 (BP Location: Right arm, Patient Position: Sitting, BP Method: Medium (Manual))   Pulse 97   Temp 97.7 °F (36.5 °C) (Temporal)   Ht 5' 2.99" (1.6 m)   Wt 83.7 kg (184 lb 9.6 oz)   SpO2 98%   BMI 32.71 kg/m²       Physical Exam  Vitals reviewed.   Constitutional:       Appearance: Normal appearance.   Cardiovascular:      Heart sounds: Normal heart sounds.   Pulmonary:      Breath sounds: Normal breath sounds.   Skin:     General: Skin is warm and dry.   Neurological:      Mental Status: She is oriented to person, place, and time.          Labs Reviewed:  Chemistry:  Lab Results   Component Value Date     10/04/2023    K 4.0 10/04/2023    CHLORIDE 104 10/04/2023    BUN 20.0 10/04/2023    CREATININE 0.89 10/04/2023    EGFRNORACEVR 75 10/04/2023    GLUCOSE 102 10/04/2023    CALCIUM 9.4 10/04/2023    ALKPHOS 80 10/04/2023    LABPROT 6.8 10/04/2023    ALBUMIN 4.2 10/04/2023    AST 35 10/04/2023    " ALT 39 10/04/2023    BCFBDLTW77BI 49.4 10/04/2023    TSH 1.56 12/30/2021        Lab Results   Component Value Date    HGBA1C 6.2 (H) 10/04/2023          Assessment & Plan:  1. Type 2 diabetes mellitus without complication, without long-term current use of insulin  Overview:  Diet controlled.     Assessment & Plan:  Advised that she does not need to check her sugar 3 and 4 times a day unless she feels badly.  I will send some more test strips but I think insurance is only going to cover 30 a month.  Take all medications as directed; compliance is critical for managing your diabetes.   Follow American Diabetic Association (ADA) dietary guidelines for eating and implement exercise into your daily regimen.   Check your glucose levels each morning and record for review at follow up.    Orders:  -     blood sugar diagnostic (TRUE METRIX GLUCOSE TEST STRIP) Strp; Use as directed to check blood glucose once daily  Dispense: 100 strip; Refill: 3         Future Appointments   Date Time Provider Department Center   4/10/2024  8:40 AM LAB, [facility] LABORATORY [facility]   4/15/2024  9:00 AM Carson, Deisi L., FNP-C [facility]   10/8/2024  8:40 AM LAB, [facility] LABORATORY DRAW STATION [facility]   10/10/2024  1:30 PM Deisi Byrd FNP-C [facility]       Follow up for Keep appointment as scheduled. Call sooner if needed.    PEYTON Ying    Lab Frequency Next Occurrence   Hemoglobin A1C Once 04/13/2024   Comprehensive Metabolic Panel Once 04/13/2024   CBC Auto Differential Once 10/13/2024   Comprehensive Metabolic Panel Once 10/13/2024   Lipid Panel Once 10/13/2024   TSH Once 10/13/2024   Hemoglobin A1C Once 10/13/2024   Microalbumin/Creatinine Ratio, Urine Once 10/13/2024

## 2023-12-20 NOTE — ASSESSMENT & PLAN NOTE
Advised that she does not need to check her sugar 3 and 4 times a day unless she feels badly.  I will send some more test strips but I think insurance is only going to cover 30 a month.

## 2023-12-22 DIAGNOSIS — J44.9 CHRONIC OBSTRUCTIVE PULMONARY DISEASE, UNSPECIFIED COPD TYPE: ICD-10-CM

## 2023-12-22 RX ORDER — ALBUTEROL SULFATE 0.83 MG/ML
SOLUTION RESPIRATORY (INHALATION)
Qty: 180 ML | Refills: 3 | Status: SHIPPED | OUTPATIENT
Start: 2023-12-22

## 2024-02-15 DIAGNOSIS — E11.9 TYPE 2 DIABETES MELLITUS WITHOUT COMPLICATION, WITHOUT LONG-TERM CURRENT USE OF INSULIN: ICD-10-CM

## 2024-02-15 RX ORDER — DIPHENHYDRAMINE HCL 25 MG
TABLET ORAL
Qty: 1 EACH | Refills: 3 | Status: SHIPPED | OUTPATIENT
Start: 2024-02-15

## 2024-03-21 DIAGNOSIS — E11.9 TYPE 2 DIABETES MELLITUS WITHOUT COMPLICATION, WITHOUT LONG-TERM CURRENT USE OF INSULIN: ICD-10-CM

## 2024-03-21 RX ORDER — LANCETS 28 GAUGE
EACH MISCELLANEOUS
Qty: 400 EACH | Refills: 3 | Status: SHIPPED | OUTPATIENT
Start: 2024-03-21

## 2024-03-22 DIAGNOSIS — J31.0 CHRONIC RHINITIS: ICD-10-CM

## 2024-03-22 RX ORDER — HYDROXYZINE PAMOATE 25 MG/1
25 CAPSULE ORAL EVERY 6 HOURS PRN
Qty: 270 CAPSULE | Refills: 0 | Status: SHIPPED | OUTPATIENT
Start: 2024-03-22

## 2024-04-10 PROCEDURE — 80053 COMPREHEN METABOLIC PANEL: CPT | Performed by: NURSE PRACTITIONER

## 2024-04-10 PROCEDURE — 83036 HEMOGLOBIN GLYCOSYLATED A1C: CPT | Performed by: NURSE PRACTITIONER

## 2024-04-12 ENCOUNTER — PATIENT MESSAGE (OUTPATIENT)
Dept: FAMILY MEDICINE | Facility: CLINIC | Age: 59
End: 2024-04-12

## 2024-04-15 ENCOUNTER — OFFICE VISIT (OUTPATIENT)
Dept: FAMILY MEDICINE | Facility: CLINIC | Age: 59
End: 2024-04-15
Payer: MEDICARE

## 2024-04-15 VITALS
DIASTOLIC BLOOD PRESSURE: 80 MMHG | WEIGHT: 180 LBS | SYSTOLIC BLOOD PRESSURE: 122 MMHG | HEIGHT: 63 IN | OXYGEN SATURATION: 96 % | BODY MASS INDEX: 31.89 KG/M2 | HEART RATE: 100 BPM | TEMPERATURE: 98 F

## 2024-04-15 DIAGNOSIS — E78.2 MIXED HYPERLIPIDEMIA: ICD-10-CM

## 2024-04-15 DIAGNOSIS — I25.10 CORONARY ARTERY DISEASE INVOLVING NATIVE CORONARY ARTERY OF NATIVE HEART WITHOUT ANGINA PECTORIS: ICD-10-CM

## 2024-04-15 DIAGNOSIS — E66.09 CLASS 1 OBESITY DUE TO EXCESS CALORIES WITHOUT SERIOUS COMORBIDITY WITH BODY MASS INDEX (BMI) OF 31.0 TO 31.9 IN ADULT: ICD-10-CM

## 2024-04-15 DIAGNOSIS — I10 PRIMARY HYPERTENSION: ICD-10-CM

## 2024-04-15 DIAGNOSIS — F17.200 SMOKER: ICD-10-CM

## 2024-04-15 DIAGNOSIS — E11.9 TYPE 2 DIABETES MELLITUS WITHOUT COMPLICATION, WITHOUT LONG-TERM CURRENT USE OF INSULIN: Primary | ICD-10-CM

## 2024-04-15 PROBLEM — B37.0 THRUSH: Status: RESOLVED | Noted: 2023-04-05 | Resolved: 2024-04-15

## 2024-04-15 LAB
CHOLEST SERPL-MCNC: 204 MG/DL (ref 0–200)
HDLC SERPL-MCNC: 45 MG/DL (ref 40–60)
LDLC SERPL DIRECT ASSAY-SCNC: 124.3 MG/DL (ref 30–100)
TRIGL SERPL-MCNC: 128 MG/DL (ref 30–200)

## 2024-04-15 PROCEDURE — 3074F SYST BP LT 130 MM HG: CPT | Mod: ,,, | Performed by: NURSE PRACTITIONER

## 2024-04-15 PROCEDURE — 3008F BODY MASS INDEX DOCD: CPT | Mod: ,,, | Performed by: NURSE PRACTITIONER

## 2024-04-15 PROCEDURE — 1159F MED LIST DOCD IN RCRD: CPT | Mod: ,,, | Performed by: NURSE PRACTITIONER

## 2024-04-15 PROCEDURE — 1160F RVW MEDS BY RX/DR IN RCRD: CPT | Mod: ,,, | Performed by: NURSE PRACTITIONER

## 2024-04-15 PROCEDURE — 80061 LIPID PANEL: CPT | Performed by: NURSE PRACTITIONER

## 2024-04-15 PROCEDURE — 99214 OFFICE O/P EST MOD 30 MIN: CPT | Mod: ,,, | Performed by: NURSE PRACTITIONER

## 2024-04-15 PROCEDURE — 3044F HG A1C LEVEL LT 7.0%: CPT | Mod: ,,, | Performed by: NURSE PRACTITIONER

## 2024-04-15 PROCEDURE — 3079F DIAST BP 80-89 MM HG: CPT | Mod: ,,, | Performed by: NURSE PRACTITIONER

## 2024-04-15 NOTE — ASSESSMENT & PLAN NOTE

## 2024-04-15 NOTE — ASSESSMENT & PLAN NOTE
Well controlled.   Continue current regimen.  Low Sodium Diet (DASH Diet - Less than 2 grams of sodium per day).  Monitor blood pressure daily and log. Report consistent numbers greater than 140/90.  Maintain healthy weight with goal BMI <30. Exercise 30 minutes per day, 5 days per week.  Smoking cessation encouraged to aid in BP reduction.

## 2024-04-15 NOTE — ASSESSMENT & PLAN NOTE
Lab Results   Component Value Date    CHOL 149 06/28/2023    HDL 42 06/28/2023    DLDL 86.7 06/28/2023    TRIG 129 06/28/2023     Complains of muscle aches; advised to try taking QOD instead of daily to see if that helps.   LDL Goal: 70  Educated on dietary modifications. Follow a low cholesterol, low saturated fat diet with less that 200mg of cholesterol a day.  Avoid fried foods and high saturated fats.  Increase dietary fiber.  Regular exercise can reduce LDL (bad cholesterol) and raise HDL (good cholesterol). Encourage physical activity 5 times per week for 30 minutes per day.

## 2024-04-15 NOTE — PROGRESS NOTES
Patient ID: Ricardo Daley  : 1965    Chief Complaint: Diabetes (With labs)    Allergies: Patient is allergic to latex, lincocin [lincomycin], penicillins, olga-24 [theophylline], and clobetasol propionate.     History of Present Illness:  The patient is a 59 y.o. White female who presents to clinic for follow up on Diabetes (With labs)     Here for 3 month follow up. She has diabetes, diet controlled. Last A1c in December was 6.2% and now it is 5.8%.     She has stopped taking her Rosuvastatin as she feels that her diet and decreased weight will keep her cholesterol where it needs to be. She said that the statins are causing muscle aches.     Social History:  reports that she has been smoking cigarettes. She started smoking about 41 years ago. She has a 10.3 pack-year smoking history. She has been exposed to tobacco smoke. She has never used smokeless tobacco. She reports that she does not drink alcohol and does not use drugs.    Past Medical History:  has a past medical history of Carpal tunnel syndrome, Chronic bronchitis with wheezing, Chronic lumbar radiculopathy, COPD (chronic obstructive pulmonary disease), Family history of early CAD, Hyperlipidemia, IFG (impaired fasting glucose), Psoriasis, and Statin intolerance.    Current Medications:  Current Outpatient Medications   Medication Instructions    albuterol (PROVENTIL) 2.5 mg /3 mL (0.083 %) nebulizer solution INHALE THE CONTENTS OF 1 VIAL VIA NEBULIZER FOUR TIMES DAILY AS NEEDED FOR WHEEZING.    albuterol (PROVENTIL/VENTOLIN HFA) 90 mcg/actuation inhaler 1-2 puffs, Inhalation, Every 6 hours PRN, Rescue    alcohol swabs (BD ALCOHOL SWABS) PadM 1 each, Topical (Top), Daily    amLODIPine (NORVASC) 5 mg, Oral    blood sugar diagnostic (TRUE METRIX GLUCOSE TEST STRIP) Strp Use as directed once daily to check blood glucose    blood-glucose meter (TRUE METRIX AIR GLUCOSE METER) Misc USE AS DIRECTED    budesonide-glycopyr-formoterol (BREZTRI AEROSPHERE)  "160-9-4.8 mcg/actuation HFAA 2 puffs, Inhalation, Daily    fluticasone propionate (FLONASE) 100 mcg, Each Nostril, Daily    hydrOXYzine pamoate (VISTARIL) 25 mg, Oral, Every 6 hours PRN    ipratropium (ATROVENT) 0.02 % nebulizer solution SMARTSI Vial(s) Via Nebulizer 4 Times Daily PRN    lancets (TRUEPLUS LANCETS) 28 gauge Misc Use as directed once daily to check blood glucose    lancets (TRUEPLUS LANCETS) 33 gauge Misc Use as directed to check blood glucose once daily    nystatin (MYCOSTATIN) 100,000 unit/mL suspension TAKE 4 ML FOUR TIMES DAILY WITH MEALS AND NIGHTLY    triamcinolone acetonide 0.1% (KENALOG) 0.1 % ointment Topical (Top), 2 times daily       Review of Systems   See HPI    Visit Vitals  /80 (BP Location: Left arm)   Pulse 100   Temp 98.2 °F (36.8 °C) (Temporal)   Ht 5' 2.99" (1.6 m)   Wt 81.6 kg (180 lb)   SpO2 96%   BMI 31.90 kg/m²       Physical Exam  Vitals reviewed.   Constitutional:       Appearance: Normal appearance.   Cardiovascular:      Heart sounds: Normal heart sounds.   Pulmonary:      Breath sounds: Normal breath sounds.   Skin:     General: Skin is warm and dry.   Neurological:      Mental Status: She is oriented to person, place, and time.          Labs Reviewed:  Chemistry:  Lab Results   Component Value Date     04/10/2024    K 3.6 04/10/2024    CHLORIDE 106 04/10/2024    BUN 17.0 04/10/2024    CREATININE 0.95 04/10/2024    EGFRNORACEVR 69 04/10/2024    GLUCOSE 106 04/10/2024    CALCIUM 9.3 04/10/2024    ALKPHOS 82 04/10/2024    LABPROT 6.9 04/10/2024    ALBUMIN 4.1 04/10/2024    AST 24 04/10/2024    ALT 28 04/10/2024    [id number] 49.4 10/04/2023    TSH 1.56 2021        Lab Results   Component Value Date    HGBA1C 5.8 04/10/2024          Assessment & Plan:  1. Type 2 diabetes mellitus without complication, without long-term current use of insulin  Overview:  Diet controlled.     Follow American Diabetic Association (ADA) dietary guidelines for eating and " implement exercise into your daily regimen.   Check your glucose levels each morning and record for review at follow up.    2. Primary hypertension  Overview:  On Amlodipine 5 mg daily    Assessment & Plan:  Well controlled.   Continue current regimen.  Low Sodium Diet (DASH Diet - Less than 2 grams of sodium per day).  Monitor blood pressure daily and log. Report consistent numbers greater than 140/90.  Maintain healthy weight with goal BMI <30. Exercise 30 minutes per day, 5 days per week.  Smoking cessation encouraged to aid in BP reduction.    3. Class 1 obesity due to excess calories without serious comorbidity with body mass index (BMI) of 31.0 to 31.9 in adult  Assessment & Plan:  Body mass index is 31.9 kg/m².  Goal BMI <30.  Exercise 5 times a week for 30 minutes per day.  Avoid soda, simple sugars, excessive rice, potatoes or bread. Limit fast foods and fried foods.  Choose complex carbs in moderation (example: green vegetables, beans, oatmeal). Eat plenty of fresh fruits and vegetables with lean meats daily.  Do not skip meals. Eat a balanced portion size.  Avoid fad diets. Consider permanent healthy life style changes.     4. Smoker  Assessment & Plan:  Encouraged to quit.     5. Mixed Hyperlipidemia  Lab Results   Component Value Date    CHOL 149 06/28/2023    HDL 42 06/28/2023    DLDL 86.7 06/28/2023    TRIG 129 06/28/2023     Patient stopped taking statin; advised to resume, even if only QOD to see if muscle aches are less this way.   LDL Goal: 70  Educated on dietary modifications. Follow a low cholesterol, low saturated fat diet with less that 200mg of cholesterol a day.  Avoid fried foods and high saturated fats.  Increase dietary fiber.  Regular exercise can reduce LDL (bad cholesterol) and raise HDL (good cholesterol). Encourage physical activity 5 times per week for 30 minutes per day.   Draw Llipid panel now    6. CAD  F/u with Dr Esposito as scheduled.     Future Appointments   Date Time  Provider Department Center   10/8/2024  8:40 AM LAB, [facility] LABORATORY DRAW STATION [facility]   10/10/2024  1:30 PM [facility]       Follow up for Keep appointment as scheduled. Call sooner if needed.    PEYTON Ying    Lab Frequency Next Occurrence   CBC Auto Differential Once 10/13/2024   Comprehensive Metabolic Panel Once 10/13/2024   Lipid Panel Once 10/13/2024   TSH Once 10/13/2024   Hemoglobin A1C Once 10/13/2024   Microalbumin/Creatinine Ratio, Urine Once 10/13/2024

## 2024-07-12 ENCOUNTER — TELEPHONE (OUTPATIENT)
Dept: FAMILY MEDICINE | Facility: CLINIC | Age: 59
End: 2024-07-12
Payer: MEDICARE

## 2024-07-12 RX ORDER — ROSUVASTATIN CALCIUM 20 MG/1
20 TABLET, COATED ORAL NIGHTLY
COMMUNITY
Start: 2024-05-08

## 2024-07-12 NOTE — TELEPHONE ENCOUNTER
----- Message from Marion Love RN sent at 7/12/2024 10:49 AM CDT -----  Regarding: statin  Pt had rosuvastatin taking off medication list 4/15/2024. Pt then had a 90 day fill of rosuvastatin 40mg prescribed by Eladio Esposito MD. Please verify if pt is taking medication or not. If he is still taking  please update medication list. thanks

## 2024-07-27 DIAGNOSIS — J31.0 CHRONIC RHINITIS: ICD-10-CM

## 2024-07-29 RX ORDER — FLUTICASONE PROPIONATE 50 MCG
2 SPRAY, SUSPENSION (ML) NASAL
Qty: 48 G | Refills: 3 | Status: SHIPPED | OUTPATIENT
Start: 2024-07-29

## 2024-08-16 DIAGNOSIS — E11.9 TYPE 2 DIABETES MELLITUS WITHOUT COMPLICATION, WITHOUT LONG-TERM CURRENT USE OF INSULIN: ICD-10-CM

## 2024-08-16 RX ORDER — CALCIUM CITRATE/VITAMIN D3 200MG-6.25
TABLET ORAL
Qty: 100 STRIP | Refills: 3 | Status: SHIPPED | OUTPATIENT
Start: 2024-08-16

## 2024-08-27 DIAGNOSIS — J44.9 CHRONIC OBSTRUCTIVE PULMONARY DISEASE, UNSPECIFIED COPD TYPE: ICD-10-CM

## 2024-08-28 RX ORDER — ALBUTEROL SULFATE 90 UG/1
INHALANT RESPIRATORY (INHALATION)
Qty: 6.7 G | Refills: 3 | Status: SHIPPED | OUTPATIENT
Start: 2024-08-28

## 2024-08-30 ENCOUNTER — HOSPITAL ENCOUNTER (OUTPATIENT)
Dept: RADIOLOGY | Facility: HOSPITAL | Age: 59
Discharge: HOME | End: 2024-08-30
Attending: SURGERY
Payer: MEDICARE

## 2024-08-30 DIAGNOSIS — R10.13 EPIGASTRIC PAIN: ICD-10-CM

## 2024-08-30 PROCEDURE — 76700 US EXAM ABDOM COMPLETE: CPT | Mod: TC

## 2024-09-12 DIAGNOSIS — E11.9 TYPE 2 DIABETES MELLITUS WITHOUT COMPLICATION, WITHOUT LONG-TERM CURRENT USE OF INSULIN: ICD-10-CM

## 2024-09-12 RX ORDER — LANCETS 33 GAUGE
EACH MISCELLANEOUS
Qty: 100 EACH | Refills: 3 | Status: SHIPPED | OUTPATIENT
Start: 2024-09-12

## 2024-10-08 PROCEDURE — 82570 ASSAY OF URINE CREATININE: CPT | Performed by: NURSE PRACTITIONER

## 2024-10-08 PROCEDURE — 84443 ASSAY THYROID STIM HORMONE: CPT | Performed by: NURSE PRACTITIONER

## 2024-10-08 PROCEDURE — 83036 HEMOGLOBIN GLYCOSYLATED A1C: CPT | Performed by: NURSE PRACTITIONER

## 2024-10-08 PROCEDURE — 80061 LIPID PANEL: CPT | Performed by: NURSE PRACTITIONER

## 2024-10-08 PROCEDURE — 80053 COMPREHEN METABOLIC PANEL: CPT | Performed by: NURSE PRACTITIONER

## 2024-10-08 PROCEDURE — 85025 COMPLETE CBC W/AUTO DIFF WBC: CPT | Performed by: NURSE PRACTITIONER

## 2024-10-10 ENCOUNTER — HOSPITAL ENCOUNTER (OUTPATIENT)
Dept: RADIOLOGY | Facility: HOSPITAL | Age: 59
Discharge: HOME | End: 2024-10-10
Attending: NURSE PRACTITIONER
Payer: MEDICARE

## 2024-10-10 ENCOUNTER — OFFICE VISIT (OUTPATIENT)
Dept: FAMILY MEDICINE | Facility: CLINIC | Age: 59
End: 2024-10-10
Payer: MEDICARE

## 2024-10-10 VITALS
WEIGHT: 180 LBS | TEMPERATURE: 98 F | HEIGHT: 63 IN | OXYGEN SATURATION: 96 % | BODY MASS INDEX: 31.89 KG/M2 | DIASTOLIC BLOOD PRESSURE: 72 MMHG | SYSTOLIC BLOOD PRESSURE: 116 MMHG | HEART RATE: 80 BPM

## 2024-10-10 DIAGNOSIS — I25.10 CORONARY ARTERY DISEASE INVOLVING NATIVE CORONARY ARTERY OF NATIVE HEART WITHOUT ANGINA PECTORIS: ICD-10-CM

## 2024-10-10 DIAGNOSIS — E55.9 VITAMIN D DEFICIENCY: ICD-10-CM

## 2024-10-10 DIAGNOSIS — M54.50 LUMBAR BACK PAIN: ICD-10-CM

## 2024-10-10 DIAGNOSIS — I10 PRIMARY HYPERTENSION: ICD-10-CM

## 2024-10-10 DIAGNOSIS — Z00.00 ENCOUNTER FOR PREVENTIVE HEALTH EXAMINATION: Primary | ICD-10-CM

## 2024-10-10 DIAGNOSIS — E11.9 TYPE 2 DIABETES MELLITUS WITHOUT COMPLICATION, WITHOUT LONG-TERM CURRENT USE OF INSULIN: ICD-10-CM

## 2024-10-10 DIAGNOSIS — F17.200 TOBACCO DEPENDENCE: ICD-10-CM

## 2024-10-10 DIAGNOSIS — E66.09 CLASS 1 OBESITY DUE TO EXCESS CALORIES WITH SERIOUS COMORBIDITY AND BODY MASS INDEX (BMI) OF 31.0 TO 31.9 IN ADULT: ICD-10-CM

## 2024-10-10 DIAGNOSIS — M25.512 ACUTE PAIN OF LEFT SHOULDER: ICD-10-CM

## 2024-10-10 DIAGNOSIS — E78.2 MIXED HYPERLIPIDEMIA: ICD-10-CM

## 2024-10-10 DIAGNOSIS — J41.8 MIXED SIMPLE AND MUCOPURULENT CHRONIC BRONCHITIS: ICD-10-CM

## 2024-10-10 DIAGNOSIS — E66.811 CLASS 1 OBESITY DUE TO EXCESS CALORIES WITH SERIOUS COMORBIDITY AND BODY MASS INDEX (BMI) OF 31.0 TO 31.9 IN ADULT: ICD-10-CM

## 2024-10-10 PROCEDURE — 73030 X-RAY EXAM OF SHOULDER: CPT | Mod: TC,LT

## 2024-10-10 PROCEDURE — 72100 X-RAY EXAM L-S SPINE 2/3 VWS: CPT | Mod: TC

## 2024-10-10 RX ORDER — ASPIRIN 325 MG
50000 TABLET, DELAYED RELEASE (ENTERIC COATED) ORAL
Qty: 12 CAPSULE | Refills: 1 | Status: SHIPPED | OUTPATIENT
Start: 2024-10-10
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
## 2024-10-10 NOTE — ASSESSMENT & PLAN NOTE
Lab Results   Component Value Date    CHOL 176 10/08/2024    HDL 39 (L) 10/08/2024    LDLDIRECT 109.4 (H) 10/08/2024    TRIG 137 10/08/2024     Stable. Continue current therapy.  LDL Goal: 70  Educated on dietary modifications. Follow a low cholesterol, low saturated fat diet with less that 200mg of cholesterol a day.  Avoid fried foods and high saturated fats.  Increase dietary fiber.  Regular exercise can reduce LDL (bad cholesterol) and raise HDL (good cholesterol). Encourage physical activity 5 times per week for 30 minutes per day.

## 2024-10-10 NOTE — PATIENT INSTRUCTIONS
Medicare Annual Wellness Visit      Patient Name: Ricardo Daley  Today's Date: 10/10/2024    Below you will find your 5-10 year Screening Plan Recommendations:  Health Maintenance       Date Due Completion Date    Hepatitis C Screening Never done ---    Cervical Cancer Screening Never done ---    Pneumococcal Vaccines (Age 0-64) (1 of 2 - PCV) Never done ---    Foot Exam Never done ---    HIV Screening Never done ---    TETANUS VACCINE Never done ---    Shingles Vaccine (1 of 2) Never done ---    RSV Vaccine (Age 60+ and Pregnant patients) (1 - Risk 60-74 years 1-dose series) Never done ---    COVID-19 Vaccine (1 - 2024-25 season) Never done ---    Mammogram 10/20/2024 10/20/2023    Eye Exam 12/11/2024 12/11/2023    Influenza Vaccine (1) 06/30/2025 (Originally 9/1/2024) ---    Hemoglobin A1c 04/08/2025 10/8/2024    Colorectal Cancer Screening 08/30/2025 8/30/2024    Diabetes Urine Screening 10/08/2025 10/8/2024    Lipid Panel 10/08/2025 10/8/2024    High Dose Statin 10/10/2025 10/10/2024          Below is your summarized Personalized Prevention Plan that addresses any concerns we discussed today at your visit. Please see attached detailed information specific to your Health Concerns.  No orders of the defined types were placed in this encounter.        The following information is provided to all patients.  This information is to help you find additional resources for any problems that may be affecting your health: Living healthy guide: www.[facility].[place].gov      Understanding Diabetes: www.diabetes.org      Eating healthy: www.cdc.gov/healthyweight      CDC home safety checklist: www.cdc.gov/steadi/patient.html      Agency on Aging: www.goea.[place].gov      Alcoholics anonymous (AA): www.aa.org      Physical Activity: www.maría.nih.gov/om7rorp      Tobacco use: www.quitwithusla.org                                 Patient Education       Quitting Smoking for Older Adults   About this topic   Most of us know that  smoking can cause problems to our health. Even if you know that it is bad, you continue smoking. It is because when you start to smoke it is hard to give up. You can become addicted to smoking.  Smoking is very harmful to your health. It can cause many illnesses and can affect how you look. The longer you smoke, the greater the effects on your health. It is never too late to try to quit.  As you stop smoking, it is normal to have signs of withdrawal. These will lessen over time. You may have signs like:  Trouble sleeping  Feeling more hungry  Weight gain  Hard stools  Dizziness  Sore throat or cough  Being irritable  Being anxious or restless  Getting frustrated or angry  Trouble thinking clearly  Low mood  Certain medicines given to you by your regular doctor can help improve these symptoms.  General   Older smokers are at high risk from smoking. The longer you have smoked, the more toxins you have been exposed to. Toxins are the bad chemicals in the tobacco. As someone who has smoked for a long time, you are more likely to have illnesses related to smoking.  It is never too late to quit smoking. It helps your health even at an older age. You will begin to notice changes soon after you stop smoking. Here are some steps you can take to help you quit smoking:  Set a date to quit smoking.  Tell your family and friends about your plan to quit smoking. Let them know how to help with your plan.  Plan ahead about what you will do instead of smoking when you crave a cigarette.  Remove cigarettes from your home, car, and work.  You may want to talk with a counselor to help you learn about:  What triggers you to smoke.  How to resist cravings.  Things to raise your chance of quitting smoking for good.  Counseling can be in person, over the phone, in a group, online, or through text messages.  Talk with your regular doctor about medicines to help you stop smoking.  Exercise regularly. This may help to lower stress. Going for  a walk is good exercise.  Keep your mouth busy with sugar-free candy or gum.  Stay away from people and places that make you want to smoke. If others close to you smoke, ask them to quit with you or to not smoke around you.     What will the results be?   When you start to quit:  Blood flow improves right away.  Your lungs become more active.  Heart rate and blood pressure lower.  You have less chance of having a heart attack.  You will help others be healthy since they are not around secondhand smoke.  After a few days to weeks:  Food tastes and smells better.  Your lungs begin to work better.  Breathing becomes easier.  After a few weeks to months:  You have more energy.  Lungs become clear and work better.  You are less likely to get colds and other lung infections.  Shortness of breath decreases.  Clogging of sinuses decreases.  When you have fully stopped smoking, after a while you will:  Lower the risk of lung cancer  Lower the risk of cancer of the mouth, esophagus, voice box, bladder, pancreas, cervix, and kidney  Lower the risk of heart illnesses like stroke and heart attack  Preserve your eyesight and the look of your teeth and aging skin  Lessen the risk for having type 2 diabetes  Reverse bone loss  Lessen the risk of postoperative problems  Lessen the risk of peptic ulcer and improve healing if ulcer is already there  Help others be healthy since they are not around secondhand smoke  Secondhand smoke:  It is important to know that smoking does not just affect you, it affects everyone around you.  It can cause cancer and heart disease in nonsmokers.  It is more dangerous for babies, children, and pregnant women.  It causes many of the same health problems in others, including your family and friends.  The only way to stop secondhand smoke is to quit smoking. It is also important to avoid places where you and others are around people who smoke.  What lifestyle changes are needed?   Thoroughly wash and  remove all ashtrays from your home and office.  Watch your eating habits and avoid gaining weight. Eat healthy foods and snacks to keep a healthy weight  Keep your mouth busy with sugar free candy and gum.  Change your daily routine. Try to change things like eat breakfast at a different place or drink tea instead of coffee.  Try to relax. Listen to music, meditate, do yoga or breathing exercises, take a relaxing bath or hot shower.  Control your thoughts and emotions. Write or record a journal, create new hobbies, and think positive.  Connect with family and friends. Talk to a friend, get a pet, share your problems with family members, and participate in your community.  What drugs may be needed?   The doctor may order drugs to:  Help with withdrawal signs  Reduce your urges to smoke  Gums, lozenges, and skin patches may be given as a substitute for tobacco.  Will there be any other care needed?   It helps to have other people to support you when you are trying to quit smoking. Talk to your family and friends about how they can best help you. You may also want to think about support groups or counseling.  When do I need to call the doctor?   You have signs of low mood or depression like:  Feeling sad, down, hopeless, or cranky most of the day, almost every day.  Losing or gaining weight without trying to do so.  Sleeping too much or too little.  Feeling tired or like you have no energy.  Acting restless or having trouble staying still.  Helpful tips   Commit yourself to stop smoking.  Focus on your goals and work to achieve them.  Talk with your doctor if you are thinking about switching to an electronic cigarette.  Where can I learn more?   American Cancer Society  https://www.cancer.org/latest-news/hopih-ddj-jsxp-to-quit-smoking.html   American Lung Association  http://www.lung.org/stop-smoking/w-syco-wt-quit/   Centers for Disease Control and Prevention  http://www.cdc.gov/tobacco/campaign/tips/   Last Reviewed  Date   2021-06-08  Consumer Information Use and Disclaimer   This information is not specific medical advice and does not replace information you receive from your health care provider. This is only a brief summary of general information. It does NOT include all information about conditions, illnesses, injuries, tests, procedures, treatments, therapies, discharge instructions or life-style choices that may apply to you. You must talk with your health care provider for complete information about your health and treatment options. This information should not be used to decide whether or not to accept your health care providers advice, instructions or recommendations. Only your health care provider has the knowledge and training to provide advice that is right for you.  Copyright   Copyright © 2021 [employer] and its affiliates and/or licensors. All rights reserved.    Patient Education       Smoking: Not Just Harmful to Your Lungs and Heart   About this topic   Smoking is very common at any age. It is one of the leading causes of poor health and illness. Smoking can lead to death. It has many harmful chemicals that are released by the tobacco you smoke and inhale. Tobacco has many forms. These are:  Cigarettes  Pipes  Cigars  Chewing tobacco  Electronic cigarettes  Loose  Hookah  All kinds of tobaccos contain many toxic substances. These are what make you sick from smoking.  Nicotine - The main thing that makes you addicted to smoking  Carbon monoxide - A poison that gets into your blood when smoking  Tar - Has chemicals that are cancerous  Lead and many others  These factors affect how much damage smoking will have on you:  How much you smoke  What you smoke  How what you smoke has been prepared  The content of what you smoke  Smoking hurts every part of the body. The lungs and the heart are most often affected by tobacco use.  General   Smoking is very harmful to your body. It can cause many illnesses and can  affect how you look.  Smoking and Cancer   Smoking is the most common cause of lung cancer. Smoking may also increase the risk of:  Mouth cancer. This can also be caused by chewing tobacco.  Bladder cancer  Cancer of the tube from the mouth to stomach. This is also called the esophagus.  Cancer of the throat. This can also be caused by chewing tobacco.  Kidney cancer  Liver cancer  Stomach, colon, and rectal cancer  Cancer of the pancreas  Cancer of the cervix in women  Cancer of the blood or leukemia  Skin cancer  Smoking and Your Lungs   If you smoke you are more likely to have:  Breathing problems  Lung infections like pneumonia or bronchitis  Lung disease such as COPD or emphysema  Asthma  Smoking and the Heart and Circulation   Causes heart disease and stroke  Smokers are at a higher risk for high blood pressure  You are more likely to get blood clots  Smoking can lower blood flow to the legs and skin  Smoking and Diabetes   If you smoke, you:  Have a higher risk of developing diabetes  May have higher blood sugar levels  May have more problems with your diabetes  Smoking and Digestion   Smokers make more stomach acid. This can cause sores or ulcers in your belly or bowel.  Your stomach acids may flow back to your esophagus. This is also called heartburn.  You have more chance of bowel irritation and swelling  Smoking and Your Bones   If you smoke:  Your bone-forming cells don't grow as fast  Your bones may become weaker (osteoporosis)  You may have more low back pain and arthritis  You may have more overuse injuries  You may have a greater risk of breaking bones  Your broken bones may take longer to heal  Smoking and Pregnancy   Makes your baby more prone to problems  You have a higher chance of having a premature baby or baby born early  Your healthy baby may die without reason. This is called sudden infant death syndrome.  Your baby may be born dead (stillbirth)  Your baby may have a low birth weight  You  have a higher risk of an ectopic pregnancy or a pregnancy outside of the uterus  You have a higher chance of having a baby with mouth or facial defects  Smoking and Your Eyes, Hair, Hands, and Mouth   If you smoke, you may notice your:  Eyes become cloudy and form cataracts  Hair may get thin and turn gray sooner than it should  Fingernails turn yellow  Teeth become yellowish brown  Gums have more problems  Teeth have problems or even become loose  Sense of taste and smell start to go away  Breath smells bad  Smoking and Skin   Smoking causes:  Less blood flow to the skin  Wrinkles start early around your eyes and mouth  Dry skin  Your skin to lose elasticity and strength  Skin may get splotchy or pale  Your face to look thin and old. This is called smoker's face.  Greater risk of getting a skin problem called psoriasis  Slower healing of cuts or bruises  Smoking and Sex Life   If you smoke you are more likely to have problems like:  Impotence  Decreased blood flow to the penis. This is also called erectile dysfunction.  Trouble getting pregnant  Early change of life or menopause for women  A higher risk of heart attack or stroke for women who smoke and use birth control pills  Damaged sperm that may lead to problems getting a woman pregnant, birth defects, or miscarriage  Smoking and Your Brain and Behavior   Smoking can:  Affect your mood  Lead to addiction  Cause long-term confusion or damage to your brain cells  Cause low mood  Smoking and Children   If you smoke, your children:  Will be more likely to smoke.  Can be sick from being around your smoke. This is called secondhand smoke.  Need to learn about the bad effects of smoking. Most smokers start before the age of 18. Encourage your children never to smoke.  Smoking and Other Effects   Smoking can cause:  Wounds to take longer to heal  You to eat poorly  Weight loss  Swelling in the body and affect the body's immune or defense system  Rheumatoid  arthritis  Greater chance of injury  You to get warts easier (human papillomavirus)  A bad odor in hair and on clothes  You to have poor sports performance  You to spend a lot of money. Smoking is an expensive habit. A smoker who smokes a pack per day in the [place] will spend over $2000 per year on cigarettes.  Health care to cost more  You to miss work more often  Hearing loss  Even if you have smoked for many years, it is not too late to quit. Your body starts to heal as soon as you stop smoking. It is better to quit when you are young, but you can still get healthier if you quit at any age.       Helpful tips   Some helpful steps you can take to help you quit smoking:  Set a date to quit smoking.  Know the reasons that make you smoke more.  Know why you want to quit smoking.  Write down each time you smoke. Include the time and what you are doing. Plan ahead about what you will do instead of smoking when that time or event reappears.  Tell your family and friends about your plan to quit smoking. Let them know how to help you.  Slowly reduce your smoking.  Remove smoking and tobacco products from your home and other places.  Avoid places and situations where you will more likely smoke. If people close to you smoke, ask them to quit with you. If they do not quit, ask them to not smoke around you.  Reward or treat yourself every time you do not smoke. Do not use food as a reward.  Ask a doctor for help.  Talk with your doctor before you try an electronic cigarette.  Where can I learn more?   Centers for Disease Control and Prevention  http://www.cdc.gov/tobacco/data_statistics/fact_sheets/health_effects/effects_cig_smoking/#overview   Centers for Disease Control and Prevention  https://www.cdc.gov/tobacco/campaign/tips/quit-smoking/guide/quit-plan.html?s_cid=OSH_tips_D9400   KidsHealth  http://kidshealth.org/teen/drug_alcohol/tobacco/smoking.html#    Food and Drug  Administration  https://www.fda.gov/TobaccoProducts/Labeling/ProductsIngredientsComponents/default.htm   Last Reviewed Date   2021-03-31  Consumer Information Use and Disclaimer   This information is not specific medical advice and does not replace information you receive from your health care provider. This is only a brief summary of general information. It does NOT include all information about conditions, illnesses, injuries, tests, procedures, treatments, therapies, discharge instructions or life-style choices that may apply to you. You must talk with your health care provider for complete information about your health and treatment options. This information should not be used to decide whether or not to accept your health care providers advice, instructions or recommendations. Only your health care provider has the knowledge and training to provide advice that is right for you.  Copyright   Copyright © 2021 [employer], Inc. and its affiliates and/or licensors. All rights reserved.    Patient Education       Weight Loss Tips   About this topic   More and more people are concerned about their weight. You can choose from many different programs. The goal of a weight loss program may be to cut down on calories or to lose extra weight through exercise.  Losing weight may mean changing your ideas about food. Going on a diet and losing weight does not mean starving yourself. It means cutting down on the amount of food you eat, making healthy food choices, and being active.  General   Ideally, you need to lose 1 to 2 pounds (0.5 to 1 kg) a week for a healthy weight loss. Losing too much weight too fast is not good. When you take in fewer calories, you will lose weight. Your ideal calorie and weight goal depends on your current age, weight, height, and personal goals. Ask your doctor or dietitian what your ideal weight is.  You need to burn 3500 calories to lose 1 pound (0.5 kg). That means cutting out 500 calories every  "day for 7 days. You can cut out 500 calories per day by eating or drinking fewer calories, burning them through exercise, or doing both. To lose that extra weight and stay healthy:  Take time to exercise.  Exercise regularly. Burn calories with activity and exercise. Exercise can help you lose weight and it also strengthens your muscles. Set a schedule where you will have time to do exercises. With just 30 to 60 minutes of exercise each day, you could burn 500 extra calories. Your metabolism stays elevated for a period of time after exercise.  If you don't have time for a 30 minute workout, try three 10 minute exercises each day.  If you work near your home, walk to work. Walking is a very good form of exercise.  Take a 20 minute walk each day. Walk during your lunch break. Park far away, so you have to walk more.  Take the steps instead of elevators. You will burn more calories this way.  If you have an illness, like diabetes or high blood pressure, ask your doctor how much exercise is right for you.  Choose healthy snacks.  Low calorie healthy snacks are a good thing. They help your blood sugar stay even and prevent you from overeating at meals. Choose a balanced snack, such as a small apple with 2 tablespoons (30 grams) of peanut butter.  Keep in mind, even "low calorie" foods can add up. Just because you choose low calorie foods does not mean you do not have to count the calories you eat.  Pack a few fresh fruits or a small salad to take to work or school. Avoid buying a snack at the nearest vending machine.  When you feel thirsty, drink water. Water has no calories and is a very good thirst quencher.  Plan healthy meals.  Plan ahead. Keep a diary of foods that are low in calories. You can also make a list of meal plans for your breakfast, lunch, and dinner. Planning ahead will prevent you from eating out at a fast food place or restaurant.  Make a grocery list before shopping so you only buy food you need. " Don't go to the store hungry.  Visit a dietitian. This person will help you make meal plans that will help you lose weight.  Add fiber to your meals. Adding fiber helps you to feel full for a longer amount of time.  Take care when eating out.  Choose lower fat and lower calorie meals. Try a seafood, lean meat, or vegetarian entrée.  Share a meal with a friend.  Try a salad and appetizer instead of an entree.  Ask for a to-go box when dinner is served and put half of your meal in it for a later meal.  Have fruit for dessert.  Drink water instead of other high calorie drinks.  Learn not to overeat.  Watch your portions. For example, the recommended serving size of meat is 3 ounces (90 grams). This is the size of a deck of playing cards. Two tablespoons (30 grams) of peanut butter is the size of a ping-pong ball. One medium fruit is the size of a baseball.  Use a smaller plate or glass during dinner for less calorie intake.  Try drinking a glass or two of water before eating. This may make you feel more full and help you to eat less.  Eat slowly. Take at least 30 minutes to eat. This gives time for your brain to tell your stomach you are full. This will help you avoid overeating.  Some people eat smaller meals more often to help not to overeat. If you can eat six small meals, make them healthy and low calorie. If three meals are best for you, know your calorie level for the day and spread it out into three healthy low calorie meals.     What will the results be?   Losing weight may make you healthier. You also may have more energy for your daily activities. You may lower disease risk. You may also add years to your life.  What changes to diet are needed?   Learn how to read nutrition labels. Know the serving size. Knowing the calories in an item will help you make healthier choices and lose weight.  Keep a diary of the food you eat. This will help you count the calories you are taking in.  Make a menu in advance. This  will help you make good choices to include in your diet.  Avoid eating 2 hours before bedtime to allow for digestion. If you eat right before you go to bed, you may also have worse heartburn.  Be sure to count the calories in the things you drink. You may want to stop drinking soda pop, beer, wine, and mixed drinks (alcohol). Some coffee drinks also have a lot of calories in them.  Who should use this diet?   A weight loss diet may be needed for people with a calculated body mass index of 25 and over. This means you are overweight or obese.  Who should not use this diet?   People with BMI of 18.5 or lower should not use this diet. Do not use this diet if your doctor does not recommend weight loss.  What foods are good to eat?   Choose foods that are nutritious. Remember, portion control is key. Even a low calorie food can become high in calories if you have too big of a serving. Here is a list of foods that are good to eat:  Vegetables:   Broccoli  Asparagus  Spinach  Green leafy vegetables  Tomatoes  Onions  Mushrooms  Cucumbers  Zucchini  Lean proteins:   Egg whites  Beans including kidney, navy, black, and chickpeas  Grilled, broiled, or baked skinless chicken breast  Grilled, broiled, or baked skinless turkey breast  Lean beef  [place] meat  Grilled, broiled, or baked fish  Beans  Nuts, such as almonds, cashews, and pistachios  Seeds  Whole grains and carbs:   Oatmeal  Brown rice  Sweet potatoes  Cereal  Whole grain bread or pasta  Fruits:   Apples  Grapefruit  Blueberries  Oranges  Bananas  Grapes  Peaches  Pineapple  Strawberries  Dairy:   Fat-free or low-fat milk and cheese  Low fat yogurt  Soy, rice, or almond milk  What foods should be limited or avoided?   Limit or avoid foods that are high in calories like:  Junk foods  Fried foods  Fatty foods  Processed meats  Food with saturated and trans fat  Whole fat dairy products  Butter  Cheese  Ice cream  Food and drinks with a lot of sugar. Some examples are  beer, wine, mixed drinks (alcohol), carbonated sodas, cakes, and cookies.  When do I need to call the doctor?   Weakness  Fast heartbeat  Dizziness  Helpful tips   Join a support group and an exercise group. It is much easier to lose weight if you have support and encouragement.  Do not skip meals. If you skip a meal, you most likely will overeat at that next meal.  Eat at the dining room table instead in front of the TV to help monitor intake.  Where can I learn more?   Academy of [employer] Dietetics  https://www.eatright.org/health/weight-loss/your-health-and-your-weight/back-to-basics-for-healthy-weight-loss   Centers for Disease Control and Prevention  https://www.cdc.gov/healthyweight/   Weight-Control Information Network  https://www.niddk.nih.gov/health-information/diet-nutrition/changing-habits-better-health   Last Reviewed Date   2021-08-09  Consumer Information Use and Disclaimer   This information is not specific medical advice and does not replace information you receive from your health care provider. This is only a brief summary of general information. It does NOT include all information about conditions, illnesses, injuries, tests, procedures, treatments, therapies, discharge instructions or life-style choices that may apply to you. You must talk with your health care provider for complete information about your health and treatment options. This information should not be used to decide whether or not to accept your health care providers advice, instructions or recommendations. Only your health care provider has the knowledge and training to provide advice that is right for you.  Copyright   Copyright © 2021 Up[employer] and its affiliates and/or licensors. All rights reserved.    Patient Education       Health Risks of a High BMI   About this topic   Your weight and health depend on a few things. Doctors use a method called BMI or body mass index as a tool to learn more about your risk of  having health problems. This tool uses your weight and your height to find your BMI.  BMI does not include things like your habits, where you live, family history, or amount of body fat. Some people with a normal BMI are still not healthy. Other people with a high BMI may be healthy. Most of the time, a person with a higher BMI is less healthy and will need more care. Ask your doctor for their view of your total health during a well visit or physical.  Being overweight or having a high BMI can hurt many parts of your body. Learn about your BMI and how your weight changes your health risks. Then you can make changes to keep yourself as healthy as you can.  General   Weighing too much can be very harmful to your body. It can cause many illnesses and can make it hard to move about.  Blood Sugar   You have a greater chance of having high blood sugar or diabetes if you weigh too much. Your body normally makes a hormone called insulin. The insulin allows your body to use the sugar in your blood.  The cells in your body may not be able to use insulin. Then your cells cannot get the sugar from your bloodstream that they need for energy. Your pancreas has to work extra hard to try and make enough insulin to keep your blood sugar healthy.  If you lose weight and exercise, your body is able to control your blood sugar levels better. You may not need as much insulin to keep your blood sugar levels healthy.  Your Heart   Being overweight makes your heart work harder.  The blood vessels that bring blood to your heart muscle may become narrow or blocked and cause a heart attack or your heart may not pump as well as it should. Your heart rate may not be normal.  Losing weight can lower your chances of having problems with your heart.  High Blood Pressure   Your heart has to work harder to pump blood through a larger body and to make sure all of your cells have the oxygen they need.  As your heart has to work harder, your blood  pressure goes up.  Being overweight can also harm your kidneys and this may also raise your blood pressure.  You may be able to lower your blood pressure through weight loss and routine exercise.  Stroke   Strokes are more likely to happen when someone has high blood pressure, heart problems, high blood sugar, or high cholesterol.  Being overweight puts you at a higher risk for all of these health problems. These problems put you at a higher risk for having a stroke.  Losing weight may help lower your blood pressure, which is the biggest risk factor for a stroke.  Cholesterol   Your cholesterol level is likely to be higher if you are overweight.  This can lead to narrowing of the blood vessels in your heart, neck, or other parts of your body. Then you may have chest pain or signs of low blood flow to a certain area. It can also lead to a heart attack or stroke.  Lowering your weight and changing what you eat may change your cholesterol levels.  Your Liver and Kidneys   You are more likely to have certain problems with your liver or kidneys if you have a high BMI.  Fatty liver disease is caused by a buildup of fat in your liver. Then your liver may not work as well as it should.  You are at a higher risk for diabetes if you are overweight. This illness can cause kidney problems.  There is no exact way to treat fatty liver disease. By losing weight, you may keep your liver from getting any worse and help your liver work better.  By losing weight, you lower your chance of having kidney problems. If you already have kidney problems, weight loss may help keep your disease from getting worse.  Bone and Joint Problems   Weighing too much can put a lot of stress on your joints.  The extra weight may make the cartilage wear away more quickly from your bones. Your cartilage lines the surfaces of your bones to help your joints glide more easily.  When your cartilage is worn, your joints become stiff and sore.  Losing weight  and exercising is one of the best ways to treat joint pain and stiffness. It can also ease the stress on your hips, knees, and back.  Cancer Risk   Gaining weight and poor health habits raise your risk for some kinds of cancer.  Healthy eating and exercise may lower your risk for some kinds of cancer.  Sleep   With a high BMI, you may have extra fat around your neck. This can make your airway smaller and put you at risk for a problem called sleep apnea. With this problem, your breathing starts and stops when you are sleeping.  Signs of sleep apnea include snoring, feeling sleepy during the day, and problems with focusing.  Sleep apnea can lead to heart problems such as increased risk for heart disease and arrhythmias like atrial fibrillation.  Losing weight can lower the amount of fat around your neck and ease sleep apnea problems.  Pregnancy   Your weight can affect how often you have a period. It may also make it harder for you to get pregnant. A high BMI can cause problems for both mom and baby.  If you are overweight and pregnant you are at a higher risk for high blood sugar or high blood pressure while you are pregnant.  You are also more likely to have your baby early or to need a C section.  Losing weight before you become pregnant may lower your chance for these problems. If you are pregnant, talk to your doctor before you try to lose weight.  Depression   You are more likely to suffer from depression or low mood when you have a high BMI.  You may have a low mood because of low self-esteem, lack of activity, lack of sleep, and health problems caused by being overweight.  Losing weight and finding out the reasons you overeat are some of the steps to improve your quality of life and deal with depression.  Where can I learn more?   National [place] of Diabetes and Digestive and Kidney Diseases  https://www.niddk.nih.gov/health-information/weight-management/adult-overweight-obesity/health-risks   Last Reviewed  Date   2021-09-15  Consumer Information Use and Disclaimer   This information is not specific medical advice and does not replace information you receive from your health care provider. This is only a brief summary of general information. It does NOT include all information about conditions, illnesses, injuries, tests, procedures, treatments, therapies, discharge instructions or life-style choices that may apply to you. You must talk with your health care provider for complete information about your health and treatment options. This information should not be used to decide whether or not to accept your health care providers advice, instructions or recommendations. Only your health care provider has the knowledge and training to provide advice that is right for you.  Copyright   Copyright © 2021 UpToDate, Inc. and its affiliates and/or licensors. All rights reserved.    Patient Education       Exercise and Aging   General   Exercise can help older adults prevent falls and stay independent longer. Exercise may also help:  You have stronger muscles and bones and better balance  You lose weight and have more energy  Make your heart and lungs stronger  Lower your chance of health problems like heart disease, high blood sugar, or breast or colon cancer  Control conditions like high blood pressure and diabetes  You manage stress and get better sleep  Your mood, self-esteem, and self-image  How you think, plan, and pay attention  There are four types of exercise: endurance, strength, balance, and flexibility.  Endurance exercises get your heart rate up and keep it up for a while. Most people should get at least 30 minutes of exercise that gets your heart rate up on 5 or more days each week. Walking, swimming, biking, or going up and down stairs are kinds of exercises to get your heart rate up. You do not have to do all 30 minutes at one time. Try doing 10 minutes 3 times a day.  Strength exercises build muscle. Lifting  weights or doing knee bends are kinds of strength exercises.  Balance exercises help to prevent falls. Raise up on your toes or stand on one leg as a kind of balance exercise.  Flexibility exercises move your joints through a full range of motion and stretch your muscles. Bend forward in a chair to stretch your back, do stretches, or bend and extend your arms or legs as a kind of flexibility exercise. Try doing 10 minutes twice a week.  Plan to include all these exercise types in your exercise program. Always check with your doctor before you start a new exercise program.  Some people do not like formal exercise classes, but there are many ways to work your muscles each day. Here are some things you can do.  Use steps instead of elevators.  Park far away in parking lots to walk farther.  Use the time while you wait. Do knee bends as you hold onto the kitchen counter while you wait for your coffee to brew.  When you unload groceries, lift the bags or a container of milk a few times to exercise your arms and legs.  Walk the long way when you go somewhere.  After you walk to the bathroom, walk a few laps around the house before sitting down.  Try doing different standing exercises after you wash your hands each time in the bathroom.  Do balance exercises while you brush your teeth.  Do an exercise or get up and walk during TV commercials.  Don't forget to exercise small muscle groups like your hands, fingers, ankles, toes, and neck. Wiggle, bend, flex, and rotate these joints from left to right and back to front to help to keep these joints flexible.  When do I need to call the doctor?   Stop exercising and talk to your doctor if you have any of these problems:  Dizziness  Shortness of breath  Pain or pressure in the chest, arms, throat, jaw, or back  Nausea or vomiting  Blood clots  Infection  Joint swelling  Open wounds  Recent hip, back, or eye surgery  New problems that start during exercise  Helpful tips   If you  have a health problem like heart disease or diabetes, talk with your doctor about the best exercises for you.  Always warm up before you stretch. Heated muscles stretch much easier than cool muscles. Try to walk or bike at an easy pace for a few minutes to warm up your muscles.  Slow your pace again after you exercise to cool down and bring your heart rate down slowly.  Be sure you do not hold your breath when you exercise because it can raise your blood pressure. If you tend to hold your breath, try to count out loud when you exercise.  Never bounce when doing stretches. Use slow and steady motions and hold your stretch for 20 to 30 seconds.  Have a routine. Doing exercises before a meal may be a good way to get into a routine.  Set small goals for yourself when you start to exercise. Use a chart to see how much you are doing. Ask someone to exercise with you.  Exercise may be slightly uncomfortable, but you should not have sharp pains. If you do get sharp pains, stop what you are doing. If the sharp pains continue, call your doctor.  Drink plenty of fluids without caffeine when you exercise and afterwards. Avoid outdoor exercise if it is too cold or too hot.  Wear the right clothes and shoes. Try layers of clothes, so that you can take them off if you get too hot. Shoes should fit well and support your feet.  Where can I learn more?   National [place] on Aging  https://www.maría.nih.gov/health/publication/exercise-physical-activity/introduction   Last Reviewed Date   2021-03-18  Consumer Information Use and Disclaimer   This information is not specific medical advice and does not replace information you receive from your health care provider. This is only a brief summary of general information. It does NOT include all information about conditions, illnesses, injuries, tests, procedures, treatments, therapies, discharge instructions or life-style choices that may apply to you. You must talk with your health care  provider for complete information about your health and treatment options. This information should not be used to decide whether or not to accept your health care providers advice, instructions or recommendations. Only your health care provider has the knowledge and training to provide advice that is right for you.  Copyright   Copyright © 2021 [employer] and its affiliates and/or licensors. All rights reserved.

## 2024-10-10 NOTE — ASSESSMENT & PLAN NOTE
Stable; continue current therapy.   Patient's COPD is controlled currently.  Patient is currently on COPD Pathway. Continue scheduled inhalers and monitor respiratory status closely.

## 2024-10-10 NOTE — ASSESSMENT & PLAN NOTE
Diabetes labs:   Lab Results   Component Value Date    HGBA1C 5.9 10/08/2024        Take all medications as directed; compliance is critical for managing your diabetes.   Follow American Diabetic Association (ADA) dietary guidelines for eating and implement exercise into your daily regimen.   Check your glucose levels each morning and record for review at follow up.

## 2024-10-10 NOTE — ASSESSMENT & PLAN NOTE
Not at goal of 69  Cholesterol not at goal, patient refuses to take any additional medication.  She is tolerating her statin 1-2 times weekly.  Advised that she try Co Q10 to see if this helps reduce discomfort associated with the statin.

## 2024-10-11 ENCOUNTER — TELEPHONE (OUTPATIENT)
Dept: FAMILY MEDICINE | Facility: CLINIC | Age: 59
End: 2024-10-11
Payer: MEDICARE

## 2024-10-11 DIAGNOSIS — E11.9 TYPE 2 DIABETES MELLITUS WITHOUT COMPLICATION, WITHOUT LONG-TERM CURRENT USE OF INSULIN: ICD-10-CM

## 2024-10-11 NOTE — TELEPHONE ENCOUNTER
Patient verbalized understanding that insurance typically does not cover accu checks QID if he does not take insulin. He states that he would like them called out that way anyway.

## 2024-10-16 ENCOUNTER — TELEPHONE (OUTPATIENT)
Dept: FAMILY MEDICINE | Facility: CLINIC | Age: 59
End: 2024-10-16
Payer: MEDICARE

## 2024-10-16 NOTE — TELEPHONE ENCOUNTER
----- Message from MARY Staples-OCHOA sent at 10/15/2024  5:21 PM CDT -----  Shoulder x-ray as well as the lumbar spine x-ray show degenerative changes this is basically osteoarthritis.

## 2024-10-24 ENCOUNTER — HOSPITAL ENCOUNTER (OUTPATIENT)
Dept: RADIOLOGY | Facility: HOSPITAL | Age: 59
Discharge: HOME | End: 2024-10-24
Attending: SURGERY
Payer: MEDICARE

## 2024-10-24 DIAGNOSIS — Z12.31 ENCOUNTER FOR SCREENING MAMMOGRAM FOR HIGH-RISK PATIENT: ICD-10-CM

## 2024-10-24 PROCEDURE — 77067 SCR MAMMO BI INCL CAD: CPT | Mod: TC

## 2024-10-24 PROCEDURE — 77063 BREAST TOMOSYNTHESIS BI: CPT | Mod: TC

## 2024-11-11 DIAGNOSIS — E11.9 TYPE 2 DIABETES MELLITUS WITHOUT COMPLICATION, WITHOUT LONG-TERM CURRENT USE OF INSULIN: ICD-10-CM

## 2024-11-12 RX ORDER — CALCIUM CITRATE/VITAMIN D3 200MG-6.25
TABLET ORAL
Qty: 100 STRIP | Refills: 13 | Status: SHIPPED | OUTPATIENT
Start: 2024-11-12

## 2024-11-18 ENCOUNTER — TELEPHONE (OUTPATIENT)
Dept: FAMILY MEDICINE | Facility: CLINIC | Age: 59
End: 2024-11-18
Payer: MEDICARE

## 2024-12-11 ENCOUNTER — TELEPHONE (OUTPATIENT)
Dept: FAMILY MEDICINE | Facility: CLINIC | Age: 59
End: 2024-12-11
Payer: MEDICARE

## 2024-12-11 NOTE — TELEPHONE ENCOUNTER
She said that she is needing a new pulmonologist because Dr. Webb isn't in network with [facility]. She called Dr. Adolfo Aguilar office and they are in network. She would see Dr. Webb every 6months so her last PFT and visit would have been around April or May. She isn't sure when her last chest xray was. I told her that we would send a request for the records and go from there. She said that she realized after calling that Deisi may need to see her because it's for a referral so she is fine with that if needed.

## 2024-12-11 NOTE — TELEPHONE ENCOUNTER
Please send a continuation of care to Dr. Obed Webb's office with [facility]. We need the last couple of visit notes, last PFT and chest xray.

## 2024-12-11 NOTE — TELEPHONE ENCOUNTER
I do not have a recent visit with her that is specifically discusses her COPD.  I think we do not ever really get into detail about that because it was always known that she was established with Dr. Webb.  I also do not have any notes from Dr. Webb so I feel like we should get those so that I have an appropriate diagnosis to refer her somewhere else.  We need to know when her last pulmonary function test was as well as when was the last chest x-ray because I think he was doing them routinely.

## 2025-01-03 ENCOUNTER — TELEPHONE (OUTPATIENT)
Dept: FAMILY MEDICINE | Facility: CLINIC | Age: 60
End: 2025-01-03
Payer: MEDICARE

## 2025-01-06 ENCOUNTER — PATIENT MESSAGE (OUTPATIENT)
Dept: FAMILY MEDICINE | Facility: CLINIC | Age: 60
End: 2025-01-06
Payer: MEDICARE

## 2025-01-09 ENCOUNTER — TELEPHONE (OUTPATIENT)
Dept: FAMILY MEDICINE | Facility: CLINIC | Age: 60
End: 2025-01-09
Payer: MEDICARE

## 2025-01-09 DIAGNOSIS — E11.9 TYPE 2 DIABETES MELLITUS WITHOUT COMPLICATION, WITHOUT LONG-TERM CURRENT USE OF INSULIN: Primary | ICD-10-CM

## 2025-01-09 NOTE — TELEPHONE ENCOUNTER
Fax from pharmacy: True Metrix Test Strips is not covered under pt's insurance coverage. New prescription requested to dispense a covered alternative of OneTouchVerio, Ultra2, or AccuChekGuide.   Please advise.

## 2025-01-17 DIAGNOSIS — J44.9 CHRONIC OBSTRUCTIVE PULMONARY DISEASE, UNSPECIFIED COPD TYPE: ICD-10-CM

## 2025-01-17 DIAGNOSIS — E11.9 TYPE 2 DIABETES MELLITUS WITHOUT COMPLICATION, WITHOUT LONG-TERM CURRENT USE OF INSULIN: Primary | ICD-10-CM

## 2025-01-17 RX ORDER — ALBUTEROL SULFATE 90 UG/1
1-2 INHALANT RESPIRATORY (INHALATION) EVERY 6 HOURS PRN
Qty: 6.7 G | Refills: 3 | Status: SHIPPED | OUTPATIENT
Start: 2025-01-17

## 2025-01-17 RX ORDER — INSULIN PUMP SYRINGE, 3 ML
EACH MISCELLANEOUS
Qty: 1 EACH | Refills: 0 | Status: SHIPPED | OUTPATIENT
Start: 2025-01-17 | End: 2026-01-17

## 2025-01-17 RX ORDER — BUDESONIDE, GLYCOPYRROLATE, AND FORMOTEROL FUMARATE 160; 9; 4.8 UG/1; UG/1; UG/1
2 AEROSOL, METERED RESPIRATORY (INHALATION) DAILY
Qty: 10.7 G | Refills: 11 | Status: SHIPPED | OUTPATIENT
Start: 2025-01-17

## 2025-02-05 ENCOUNTER — TELEPHONE (OUTPATIENT)
Dept: FAMILY MEDICINE | Facility: CLINIC | Age: 60
End: 2025-02-05
Payer: MEDICARE

## 2025-02-05 DIAGNOSIS — E11.9 TYPE 2 DIABETES MELLITUS WITHOUT COMPLICATION, WITHOUT LONG-TERM CURRENT USE OF INSULIN: ICD-10-CM

## 2025-02-05 RX ORDER — BLOOD-GLUCOSE METER
EACH MISCELLANEOUS
Qty: 1 EACH | OUTPATIENT
Start: 2025-02-05

## 2025-02-06 ENCOUNTER — TELEPHONE (OUTPATIENT)
Dept: FAMILY MEDICINE | Facility: CLINIC | Age: 60
End: 2025-02-06
Payer: MEDICARE

## 2025-02-06 DIAGNOSIS — J44.9 CHRONIC OBSTRUCTIVE PULMONARY DISEASE, UNSPECIFIED COPD TYPE: Primary | ICD-10-CM

## 2025-02-06 RX ORDER — BUDESONIDE, GLYCOPYRROLATE, AND FORMOTEROL FUMARATE 160; 9; 4.8 UG/1; UG/1; UG/1
2 AEROSOL, METERED RESPIRATORY (INHALATION) 2 TIMES DAILY
Qty: 5.9 G | Refills: 0 | COMMUNITY
Start: 2025-02-06

## 2025-02-06 NOTE — TELEPHONE ENCOUNTER
Pt came to the office. She said that it is her Breztri that she is needing. Deisi said that she could have a sample. Sample given.

## 2025-02-06 NOTE — TELEPHONE ENCOUNTER
I called and left a voicemail for pt to call back. I am not sure what medication she is needing samples for.

## 2025-02-19 ENCOUNTER — TELEPHONE (OUTPATIENT)
Dept: FAMILY MEDICINE | Facility: CLINIC | Age: 60
End: 2025-02-19
Payer: MEDICARE

## 2025-02-19 DIAGNOSIS — J44.9 CHRONIC OBSTRUCTIVE PULMONARY DISEASE, UNSPECIFIED COPD TYPE: ICD-10-CM

## 2025-02-19 RX ORDER — BUDESONIDE, GLYCOPYRROLATE, AND FORMOTEROL FUMARATE 160; 9; 4.8 UG/1; UG/1; UG/1
2 AEROSOL, METERED RESPIRATORY (INHALATION) 2 TIMES DAILY
Qty: 32.1 G | Refills: 4 | Status: SHIPPED | OUTPATIENT
Start: 2025-02-19

## 2025-02-19 NOTE — TELEPHONE ENCOUNTER
Pt states that they aren't filling her Breztri. She asked for samples. I told her that I would check to see if we had any and that I would contact [facility] then call her back. She also said that if we do not have samples, she should be ok if she can do her neb treatments QID. We do not have any samples. I called [facility] and spoke with Mattie. Verbal order given for Breztri. They are going to expedite her rx so she should receive it Monday or Tuesday. Please advise.

## 2025-03-03 DIAGNOSIS — E11.9 TYPE 2 DIABETES MELLITUS WITHOUT COMPLICATION, WITHOUT LONG-TERM CURRENT USE OF INSULIN: ICD-10-CM

## 2025-03-03 RX ORDER — BLOOD-GLUCOSE METER
EACH MISCELLANEOUS
Qty: 1 EACH | Refills: 0 | Status: SHIPPED | OUTPATIENT
Start: 2025-03-03

## 2025-03-11 DIAGNOSIS — J44.9 CHRONIC OBSTRUCTIVE PULMONARY DISEASE, UNSPECIFIED COPD TYPE: ICD-10-CM

## 2025-03-12 RX ORDER — ALBUTEROL SULFATE 90 UG/1
INHALANT RESPIRATORY (INHALATION)
Qty: 6.7 G | Refills: 13 | Status: SHIPPED | OUTPATIENT
Start: 2025-03-12

## 2025-03-25 ENCOUNTER — TELEPHONE (OUTPATIENT)
Dept: FAMILY MEDICINE | Facility: CLINIC | Age: 60
End: 2025-03-25

## 2025-03-25 ENCOUNTER — OFFICE VISIT (OUTPATIENT)
Dept: FAMILY MEDICINE | Facility: CLINIC | Age: 60
End: 2025-03-25
Payer: MEDICARE

## 2025-03-25 VITALS
SYSTOLIC BLOOD PRESSURE: 132 MMHG | BODY MASS INDEX: 30.12 KG/M2 | WEIGHT: 170 LBS | DIASTOLIC BLOOD PRESSURE: 80 MMHG | HEIGHT: 63 IN | TEMPERATURE: 97 F | OXYGEN SATURATION: 96 % | HEART RATE: 89 BPM

## 2025-03-25 DIAGNOSIS — S49.92XA INJURY OF LEFT SHOULDER, INITIAL ENCOUNTER: Primary | ICD-10-CM

## 2025-03-25 PROCEDURE — 1160F RVW MEDS BY RX/DR IN RCRD: CPT | Mod: ,,, | Performed by: NURSE PRACTITIONER

## 2025-03-25 PROCEDURE — 99214 OFFICE O/P EST MOD 30 MIN: CPT | Mod: ,,, | Performed by: NURSE PRACTITIONER

## 2025-03-25 PROCEDURE — 3075F SYST BP GE 130 - 139MM HG: CPT | Mod: ,,, | Performed by: NURSE PRACTITIONER

## 2025-03-25 PROCEDURE — 3008F BODY MASS INDEX DOCD: CPT | Mod: ,,, | Performed by: NURSE PRACTITIONER

## 2025-03-25 PROCEDURE — 3079F DIAST BP 80-89 MM HG: CPT | Mod: ,,, | Performed by: NURSE PRACTITIONER

## 2025-03-25 PROCEDURE — 1159F MED LIST DOCD IN RCRD: CPT | Mod: ,,, | Performed by: NURSE PRACTITIONER

## 2025-03-25 RX ORDER — CALCIPOTRIENE 50 UG/G
1 CREAM TOPICAL 2 TIMES DAILY
COMMUNITY
Start: 2025-03-03

## 2025-03-25 NOTE — PROGRESS NOTES
Patient ID: Ricardo Daley  : 1965    Chief Complaint: Shoulder Pain    Allergies: Patient is allergic to latex, lincocin [lincomycin], penicillins, olga-24 [theophylline], and clobetasol propionate.     Subjective :  The patient is a 60 y.o. White female who presents to clinic for follow up on Shoulder Pain   History of Present Illness    HPI:  Patient reports sudden left shoulder pain this morning around 8:00 AM when she reached back to grab a blanket and pulled it forward. She felt multiple audible and palpable sensations in her shoulder, describing it as a snap and a crunch. She felt as if her shoulder had been struck forcefully, with a sensation of internal damage. The pain is localized to the left shoulder, particularly in the anterior and posterior regions. She has significant limitation in range of motion, unable to lift her arm or rotate it outwards. She can move it forward slightly but cannot lift it up. The pain is constant and burning in nature. She has not attempted any pain medication for relief. She mentions having degenerative changes and a previous rib injury on the same side, though it is unclear if these are related to the current issue. She has been awake all night, though this was unrelated to the shoulder pain and occurred before the incident.    MEDICATIONS:  Patient is on a statin for cholesterol management. She takes Ibuprofen 800 mg, which she cuts into quarters due to its sedating effect.    MEDICAL HISTORY:  Patient has a history of an old rib injury on the left side. Degenerative changes were noted on her recent x-ray.    IMAGING:  Patient underwent an x-ray of her left shoulder this morning. The results showed no fractures or dislocations. Additional findings included an old rib injury on the same side and some degenerative changes.      ROS:  ROS as indicated in HPI.           Social History:  reports that she has been smoking cigarettes. She started smoking about 42 years ago. She  "has a 10.6 pack-year smoking history. She has been exposed to tobacco smoke. She has never used smokeless tobacco. She reports that she does not drink alcohol and does not use drugs.    Past Medical History:  has a past medical history of Carpal tunnel syndrome, Chronic bronchitis with wheezing, Chronic lumbar radiculopathy, COPD (chronic obstructive pulmonary disease), Family history of early CAD, Hyperlipidemia, IFG (impaired fasting glucose), Psoriasis, and Statin intolerance.    Current Medications:  Current Outpatient Medications   Medication Instructions    albuterol (PROVENTIL) 2.5 mg /3 mL (0.083 %) nebulizer solution INHALE THE CONTENTS OF 1 VIAL VIA NEBULIZER FOUR TIMES DAILY AS NEEDED FOR WHEEZING.    albuterol (PROVENTIL/VENTOLIN HFA) 90 mcg/actuation inhaler USE 1 TO 2 INHALATIONS BY MOUTH  EVERY 6 HOURS AS NEEDED FOR  WHEEZING    alcohol swabs (BD ALCOHOL SWABS) PadM 1 each, Topical (Top), Daily    amLODIPine (NORVASC) 5 mg    blood sugar diagnostic (ACCU-CHEK GUIDE TEST STRIPS) Strp 1 strip, Misc.(Non-Drug; Combo Route), 4 times daily    blood-glucose meter (ACCU-CHEK GUIDE GLUCOSE METER) Misc USE TO CHECK BLOOD SUGAR TWICE  DAILY    blood-glucose meter (TRUE METRIX AIR GLUCOSE METER) Misc USE AS DIRECTED    budesonide-glycopyr-formoterol (BREZTRI AEROSPHERE) 160-9-4.8 mcg/actuation HFAA 2 puffs, Inhalation, 2 times daily    calcipotriene (DOVONOX) 0.005 % cream 1 application , 2 times daily    fluticasone propionate (FLONASE) 100 mcg, Each Nostril    hydrOXYzine pamoate (VISTARIL) 25 mg, Oral, Every 6 hours PRN    ipratropium (ATROVENT) 0.02 % nebulizer solution SMARTSI Vial(s) Via Nebulizer 4 Times Daily PRN    triamcinolone acetonide 0.1% (KENALOG) 0.1 % ointment 2 times daily         Visit Vitals  /80 (BP Location: Right arm)   Pulse 89   Temp 96.8 °F (36 °C) (Temporal)   Ht 5' 2.99" (1.6 m)   Wt 77.1 kg (170 lb)   SpO2 96%   BMI 30.12 kg/m²       Physical Exam  Vitals reviewed. "   Constitutional:       Appearance: Normal appearance. She is obese.   Musculoskeletal:      Left shoulder: Tenderness and bony tenderness present. Decreased range of motion. Decreased strength. Normal pulse.   Skin:     General: Skin is warm and dry.        Narrative & Impression  EXAMINATION:  STUDY: XR SHOULDER COMPLETE 2 OR MORE VIEWS LEFT     CLINICAL HISTORY AND TECHNIQUE:  Episode pain and crepitus during normal range of motion     COMPARISON:  10/10/2024     FINDINGS:  Mild degenerative changes are noted involving the left acromioclavicular and glenohumeral joint.  Old, healed left-sided rib fractures are present as seen previously.  I see no definite fractures, dislocations, or other significant abnormalities.     Impression:     1. Low-grade chronic changes are present as described above.  See above comments.        Electronically signed by:Adan Leo  Date:                                            03/25/2025  Time:                                           10:15        Exam Ended: 03/25/25 10:12 CDT Last Resulted: 03/25/25 10:15 CDT             Assessment & Plan:  1. Injury of left shoulder, initial encounter  -     X-Ray Shoulder 2 or More Views Left; Future; Expected date: 03/25/2025         Assessment & Plan    S49.92XA Injury of left shoulder, initial encounter  E11.9 Type 2 diabetes mellitus without complication, without long-term current use of insulin    IMPRESSION:   Assessed left shoulder injury after patient reported hearing popping sounds while reaching for a blanket.   Performed exam of the shoulder.   Reviewed XR results, which showed no fracture or dislocation, noted presence of degenerative changes and old rib injury.   Concluded injury likely involves torn or damaged soft tissue structures (tendons, ligaments).   Ruled out narcotic medications for pain management due to patient's history.    S49.92XA INJURY OF LEFT SHOULDER, INITIAL ENCOUNTER:   Explained that ligaments and tendons can  produce popping or snapping sounds when injured.   Discussed the risk of developing adhesive capsulitis if the arm remains immobile for an extended period.   Educated the patient about the importance of gentle stretching exercises after the initial rest period.   Instructed the patient to apply cryotherapy to the injured shoulder 3-4 times daily for 15-minute sessions.   Advised the patient to rest the shoulder for a few days, then begin gentle stretches after 2-3 days to prevent adhesive capsulitis.   Continued ibuprofen 800 mg as needed for pain and inflammation (instructed the patient to split tablet into quarters).   Ordered left shoulder radiograph to rule out dislocation or fracture.            Future Appointments   Date Time Provider Department Center   4/4/2025  8:50 AM LAB, [facility]   4/11/2025  9:30 [facility]   10/15/2025  8:00 AM LAB, [facility] LABORATORY DRAW STATION [facility]   10/20/2025  8:30 AM Deisi Byrd FNP-C [facility]       Follow up for Keep appointment as scheduled. Call sooner if needed.    PEYTON Ying    Lab Frequency Next Occurrence   Hemoglobin A1C Once 04/10/2025   Comprehensive Metabolic Panel Once 04/10/2025   CBC Auto Differential Once 10/10/2025   Comprehensive Metabolic Panel Once 10/10/2025   Lipid Panel Once 10/10/2025   TSH Once 10/10/2025   Hemoglobin A1C Once 10/10/2025   Vitamin D Once 04/10/2025   Vitamin D Once 10/10/2025   Microalbumin/Creatinine Ratio, Urine Once 10/10/2025   Ambulatory referral/consult to Pulmonology Once 01/13/2025            This note was generated with the assistance of ambient listening technology. Verbal consent was obtained by the patient and accompanying visitor(s) for the recording of patient appointment to facilitate this note. I attest to having reviewed and edited the generated note for accuracy, though some syntax  or spelling errors may persist. Please contact the author of this note for any clarification.

## 2025-03-25 NOTE — TELEPHONE ENCOUNTER
I called and spoke with pt. She said that she isn't able to wear the sling d/t it making her sweat. She said that even with the sling, she tried to lift something and felt it pop. She did get some pain relief after that. She stated that she will take it easy for the next couple of days and see if it gets better. Pt instructed to contact the office if the pain worsens. She verbalized understanding.

## 2025-03-26 ENCOUNTER — PATIENT OUTREACH (OUTPATIENT)
Facility: CLINIC | Age: 60
End: 2025-03-26
Payer: MEDICARE

## 2025-03-26 NOTE — PROGRESS NOTES
Health Maintenance Topic(s) Outreach Outcomes & Actions Taken:    Cervical Cancer Screening - Outreach Outcomes & Actions Taken  : Patient refuses    Eye Exam - Outreach Outcomes & Actions Taken  : Left message    Lab(s) - Outreach Outcomes & Actions Taken  : Scheduled 4/4/25    Primary Care Appt - Outreach Outcomes & Actions Taken  : Scheduled 4/11/25    Diabetic Foot Exam - Outreach Outcomes & Actions Taken  : PCP to address at 4/11/25 appointment    Medication Adherence / Statins - Outreach Outcomes & Actions Taken  : Rosuvastatin discontinued on 3/25/25

## 2025-04-03 ENCOUNTER — RESULTS FOLLOW-UP (OUTPATIENT)
Dept: FAMILY MEDICINE | Facility: CLINIC | Age: 60
End: 2025-04-03

## 2025-04-04 PROCEDURE — 80053 COMPREHEN METABOLIC PANEL: CPT | Performed by: NURSE PRACTITIONER

## 2025-04-04 PROCEDURE — 82306 VITAMIN D 25 HYDROXY: CPT | Performed by: NURSE PRACTITIONER

## 2025-04-04 PROCEDURE — 83036 HEMOGLOBIN GLYCOSYLATED A1C: CPT | Performed by: NURSE PRACTITIONER

## 2025-04-08 ENCOUNTER — TELEPHONE (OUTPATIENT)
Dept: FAMILY MEDICINE | Facility: CLINIC | Age: 60
End: 2025-04-08
Payer: MEDICARE

## 2025-04-08 DIAGNOSIS — M79.10 MYALGIA DUE TO STATIN: Primary | ICD-10-CM

## 2025-04-08 DIAGNOSIS — T46.6X5A MYALGIA DUE TO STATIN: Primary | ICD-10-CM

## 2025-04-08 NOTE — TELEPHONE ENCOUNTER
She was on rosuvastatin 20 mg prescribed by Dr. Esposito, a 90 day refill was sent in December. That med is not on our list which is why I think she is flagged on my statin list.  Make sure that she is still taking that and we need to send refills if she is.  If not we need to document why, for example if she is intolerant due to myalgias.

## 2025-04-11 ENCOUNTER — OFFICE VISIT (OUTPATIENT)
Dept: FAMILY MEDICINE | Facility: CLINIC | Age: 60
End: 2025-04-11
Payer: MEDICARE

## 2025-04-11 ENCOUNTER — RESULTS FOLLOW-UP (OUTPATIENT)
Dept: FAMILY MEDICINE | Facility: CLINIC | Age: 60
End: 2025-04-11

## 2025-04-11 VITALS
SYSTOLIC BLOOD PRESSURE: 120 MMHG | OXYGEN SATURATION: 97 % | HEART RATE: 82 BPM | TEMPERATURE: 98 F | WEIGHT: 167 LBS | HEIGHT: 63 IN | BODY MASS INDEX: 29.59 KG/M2 | DIASTOLIC BLOOD PRESSURE: 80 MMHG

## 2025-04-11 DIAGNOSIS — F17.200 TOBACCO DEPENDENCE: ICD-10-CM

## 2025-04-11 DIAGNOSIS — T46.6X5A MYALGIA DUE TO STATIN: ICD-10-CM

## 2025-04-11 DIAGNOSIS — J41.8 MIXED SIMPLE AND MUCOPURULENT CHRONIC BRONCHITIS: ICD-10-CM

## 2025-04-11 DIAGNOSIS — E78.2 MIXED HYPERLIPIDEMIA: ICD-10-CM

## 2025-04-11 DIAGNOSIS — I25.10 ATHEROSCLEROSIS OF NATIVE CORONARY ARTERY OF NATIVE HEART WITHOUT ANGINA PECTORIS: ICD-10-CM

## 2025-04-11 DIAGNOSIS — E11.9 TYPE 2 DIABETES MELLITUS WITHOUT COMPLICATION, WITHOUT LONG-TERM CURRENT USE OF INSULIN: Primary | ICD-10-CM

## 2025-04-11 DIAGNOSIS — S60.811A ABRASION OF RIGHT WRIST, INITIAL ENCOUNTER: ICD-10-CM

## 2025-04-11 DIAGNOSIS — I10 PRIMARY HYPERTENSION: ICD-10-CM

## 2025-04-11 DIAGNOSIS — E55.9 VITAMIN D DEFICIENCY: ICD-10-CM

## 2025-04-11 DIAGNOSIS — M79.10 MYALGIA DUE TO STATIN: ICD-10-CM

## 2025-04-11 DIAGNOSIS — E66.3 OVERWEIGHT: ICD-10-CM

## 2025-04-11 PROBLEM — E66.811 CLASS 1 OBESITY WITH SERIOUS COMORBIDITY AND BODY MASS INDEX (BMI) OF 31.0 TO 31.9 IN ADULT: Status: RESOLVED | Noted: 2023-04-05 | Resolved: 2025-04-11

## 2025-04-11 PROCEDURE — 1160F RVW MEDS BY RX/DR IN RCRD: CPT | Mod: ,,, | Performed by: NURSE PRACTITIONER

## 2025-04-11 PROCEDURE — 3044F HG A1C LEVEL LT 7.0%: CPT | Mod: ,,, | Performed by: NURSE PRACTITIONER

## 2025-04-11 PROCEDURE — G2211 COMPLEX E/M VISIT ADD ON: HCPCS | Mod: ,,, | Performed by: NURSE PRACTITIONER

## 2025-04-11 PROCEDURE — 3079F DIAST BP 80-89 MM HG: CPT | Mod: ,,, | Performed by: NURSE PRACTITIONER

## 2025-04-11 PROCEDURE — 99214 OFFICE O/P EST MOD 30 MIN: CPT | Mod: ,,, | Performed by: NURSE PRACTITIONER

## 2025-04-11 PROCEDURE — 3074F SYST BP LT 130 MM HG: CPT | Mod: ,,, | Performed by: NURSE PRACTITIONER

## 2025-04-11 PROCEDURE — 1159F MED LIST DOCD IN RCRD: CPT | Mod: ,,, | Performed by: NURSE PRACTITIONER

## 2025-04-11 PROCEDURE — 3008F BODY MASS INDEX DOCD: CPT | Mod: ,,, | Performed by: NURSE PRACTITIONER

## 2025-04-11 RX ORDER — BEMPEDOIC ACID 180 MG/1
180 TABLET, FILM COATED ORAL DAILY
Qty: 90 TABLET | Refills: 3 | Status: SHIPPED | OUTPATIENT
Start: 2025-04-11

## 2025-04-11 RX ORDER — ASPIRIN 325 MG
50000 TABLET, DELAYED RELEASE (ENTERIC COATED) ORAL
Qty: 12 CAPSULE | Refills: 1 | Status: SHIPPED | OUTPATIENT
Start: 2025-04-11

## 2025-04-11 NOTE — PROGRESS NOTES
Patient ID: Ricardo Daley  : 1965    Chief Complaint: Shoulder Pain (6 month follow up with labs )    Allergies: Patient is allergic to latex, lincocin [lincomycin], penicillins, olga-24 [theophylline], and clobetasol propionate.     Subjective :  The patient is a 60 y.o. White female who presents to clinic for follow up on Shoulder Pain (6 month follow up with labs )   History of Present Illness    HPI:  Patient's most recent A1C result is 6.1, a slight increase from her previous result of 5.9. She attributes this to consuming 8-12 Mediterranean oranges daily and eating plums once a week. She reports successful weight loss, decreasing from 180 lbs in October to 167 lbs currently, a total loss of 13 lbs over approximately 6 months through diet control alone.    She discontinued Rosuvastatin in December due to severe muscle pains, described as intense muscle spasms through her ribs that disturbed her sleep. She tried Nexlitol for 2 weeks as an alternative and reported less severe muscle pains, mainly in her toes, which she found more tolerable. She did not continue this medication as the prescription was never sent by cardiology.    She reports improvement in her breathing and overall stamina. She can now walk at an almost natural pace, ascend and descend stairs without dyspnea, and carry heavy boxes without shortness of breath. She takes Breztri daily for her respiratory issues but does not use her nebulizer regularly, only when absolutely necessary. She carries a rescue inhaler but denies frequent use.    She continues to smoke, having switched to rolling her own cigarettes with 100% cotton filters due to an allergy to standard cigarette filters. She smokes 5-10 cigarettes per day, spaced out, and expresses a desire to quit in the future, potentially reducing to 1-2 cigarettes per day within 6 months.    She mentions high stress levels due to various life events, including financial issues, car problems, and  home repairs.    She denies having any current breathing difficulties.    MEDICATIONS:  Patient is on Breztri daily for breathing and Amlodipine 2.5 mg daily for blood pressure. She uses a rescue inhaler as needed for breathing difficulties. She discontinued Rosuvastatin in December due to intense muscle spasms through ribs. Patient also tried Nexlitol for 2 weeks but discontinued it due to less severe but still present muscle pains. She stopped taking vitamin D supplement about a year ago.    MEDICAL HISTORY:  Patient has a history of diet-controlled diabetes. She has coronary artery disease, with a calcium score of 494 in 2022. She also has vitamin D deficiency, with a current level of 14.    FAMILY HISTORY:  Family history is significant for high cholesterol.    TEST RESULTS:  Patient's A1c is 6.1, which has increased from a previous result of 5.9. Her cholesterol was measured in October, with an LDL of 109. Her Vitamin D level is 14, which should be 30. Patient underwent a calcium score test in 2022, which resulted in a score of 494.    ALLERGIES:  Patient reports an allergic reaction to fiberglass, which was mentioned in the context of cigarette filters.    SOCIAL HISTORY:  Smoking: Currently smokes 5-10 rolled cigarettes per day with cotton filters      ROS:  ROS as indicated in HPI.           Social History:  reports that she has been smoking cigarettes. She started smoking about 42 years ago. She has a 10.6 pack-year smoking history. She has been exposed to tobacco smoke. She has never used smokeless tobacco. She reports that she does not drink alcohol and does not use drugs.    Past Medical History:  has a past medical history of Carpal tunnel syndrome, Chronic bronchitis with wheezing, Chronic lumbar radiculopathy, Class 1 obesity with serious comorbidity and body mass index (BMI) of 31.0 to 31.9 in adult, COPD (chronic obstructive pulmonary disease), Family history of early CAD, Hyperlipidemia, IFG  "(impaired fasting glucose), Psoriasis, and Statin intolerance.    Current Medications:  Current Outpatient Medications   Medication Instructions    albuterol (PROVENTIL) 2.5 mg /3 mL (0.083 %) nebulizer solution INHALE THE CONTENTS OF 1 VIAL VIA NEBULIZER FOUR TIMES DAILY AS NEEDED FOR WHEEZING.    albuterol (PROVENTIL/VENTOLIN HFA) 90 mcg/actuation inhaler USE 1 TO 2 INHALATIONS BY MOUTH  EVERY 6 HOURS AS NEEDED FOR  WHEEZING    alcohol swabs (BD ALCOHOL SWABS) PadM 1 each, Topical (Top), Daily    amLODIPine (NORVASC) 5 mg    blood sugar diagnostic (ACCU-CHEK GUIDE TEST STRIPS) Strp 1 strip, Misc.(Non-Drug; Combo Route), 4 times daily    blood-glucose meter (ACCU-CHEK GUIDE GLUCOSE METER) Misc USE TO CHECK BLOOD SUGAR TWICE  DAILY    blood-glucose meter (TRUE METRIX AIR GLUCOSE METER) Misc USE AS DIRECTED    budesonide-glycopyr-formoterol (BREZTRI AEROSPHERE) 160-9-4.8 mcg/actuation HFAA 2 puffs, Inhalation, 2 times daily    calcipotriene (DOVONOX) 0.005 % cream 1 application , 2 times daily    cholecalciferol (vitamin D3) 50,000 Units, Oral, Every 7 days    fluticasone propionate (FLONASE) 100 mcg, Each Nostril    hydrOXYzine pamoate (VISTARIL) 25 mg, Oral, Every 6 hours PRN    ipratropium (ATROVENT) 0.02 % nebulizer solution SMARTSI Vial(s) Via Nebulizer 4 Times Daily PRN    NEXLETOL 180 mg, Oral, Daily    triamcinolone acetonide 0.1% (KENALOG) 0.1 % ointment 2 times daily         Visit Vitals  /80 (BP Location: Right arm)   Pulse 82   Temp 97.7 °F (36.5 °C) (Temporal)   Ht 5' 2.99" (1.6 m)   Wt 75.8 kg (167 lb)   SpO2 97%   BMI 29.59 kg/m²       Physical Exam  Vitals reviewed.   Constitutional:       Appearance: Normal appearance.   Cardiovascular:      Rate and Rhythm: Normal rate and regular rhythm.      Heart sounds: Normal heart sounds.   Pulmonary:      Effort: Pulmonary effort is normal.      Breath sounds: Normal breath sounds.   Skin:     General: Skin is warm and dry.   Neurological:      " Mental Status: She is oriented to person, place, and time.          Labs Reviewed:  Chemistry:  Lab Results   Component Value Date     04/04/2025    K 4.1 04/04/2025    BUN 17 04/04/2025    CREATININE 0.88 04/04/2025    EGFRNORACEVR 75 04/04/2025    GLUCOSE 95 04/04/2025    CALCIUM 9.2 04/04/2025    ALKPHOS 71 04/04/2025    LABPROT 6.5 04/04/2025    ALBUMIN 4.0 04/04/2025    AST 20 04/04/2025    ALT 17 04/04/2025    VXHZXFQY97AT 14 (L) 04/04/2025    TSH 1.270 10/08/2024        Lab Results   Component Value Date    HGBA1C 6.1 (H) 04/04/2025          Assessment & Plan:  1. Type 2 diabetes mellitus without complication, without long-term current use of insulin  Overview:  Diet controlled.   A1c 5.9% (10/2024)    Assessment & Plan:  Diabetes labs:   Lab Results   Component Value Date    HGBA1C 6.1 (H) 04/04/2025      Continue current medications.   Take all medications as directed; compliance is critical for managing your diabetes.   Follow American Diabetic Association (ADA) dietary guidelines for eating and implement exercise into your daily regimen.   Check your glucose levels each morning and record for review at follow up.        2. Myalgia due to statin    3. Overweight  Assessment & Plan:  Body mass index is 29.59 kg/m².  Goal BMI <30.  Exercise 5 times a week for 30 minutes per day.  Avoid soda, simple sugars, excessive rice, potatoes or bread. Limit fast foods and fried foods.  Choose complex carbs in moderation (example: green vegetables, beans, oatmeal). Eat plenty of fresh fruits and vegetables with lean meats daily.  Do not skip meals. Eat a balanced portion size.  Avoid fad diets. Consider permanent healthy life style changes.         4. Vitamin D deficiency  Assessment & Plan:  Start Cholecalciferol weekly x 6 months; then switch to OTC Ca with vitamin D supplement    Orders:  -     cholecalciferol, vitamin D3, 1,250 mcg (50,000 unit) capsule; Take 1 capsule (50,000 Units total) by mouth every 7  days.  Dispense: 12 capsule; Refill: 1    5. Atherosclerosis of native coronary artery of native heart without angina pectoris  Overview:  Calcium score 494: 2022  On statin therapy. Cholesterol not at goal. Tolerating statin twice weekly; refused Zetia. Refuses ASA.   She discontinued statin 12/2024 due to myalgias. Tried Nexletol and did ok on that. Still refuses Zetia. Not open to other options at this time.     Assessment & Plan:  Not at goal of 70  Cholesterol not at goal, patient refuses to take any additional medication.  She is tolerating her trial of Nexlitol so will continue that for now.     Orders:  -     bempedoic acid (NEXLETOL) 180 mg Tab; Take 1 tablet (180 mg total) by mouth once daily.  Dispense: 90 tablet; Refill: 3    6. Mixed hyperlipidemia  Overview:  On Rosuvastatin 20 mg daily-did not tolerate due to severe myalgias    Assessment & Plan:  Lab Results   Component Value Date    CHOL 176 10/08/2024    HDL 39 (L) 10/08/2024    LDLDIRECT 109.4 (H) 10/08/2024    TRIG 137 10/08/2024     Stable. Continue with Nexlitol.   LDL Goal: <70  Educated on dietary modifications. Follow a low cholesterol, low saturated fat diet with less that 200mg of cholesterol a day.  Avoid fried foods and high saturated fats.  Increase dietary fiber.  Regular exercise can reduce LDL (bad cholesterol) and raise HDL (good cholesterol). Encourage physical activity 5 times per week for 30 minutes per day.         7. Abrasion of right wrist, initial encounter    8. Mixed simple and mucopurulent chronic bronchitis  Overview:  On Breztri daily.    Assessment & Plan:  Stable; continue current therapy.   Patient's COPD is controlled currently.  Patient is currently on COPD Pathway. Continue scheduled inhalers and monitor respiratory status closely.           9. Tobacco dependence  Assessment & Plan:  Smokes 5-10 cigarettes daily      10. Primary hypertension  Overview:  On Amlodipine 5 mg daily    Assessment & Plan:  Well  controlled.   Continue current regimen.   Low Sodium Diet (DASH Diet - Less than 2 grams of sodium per day).  Monitor blood pressure daily and log. Report consistent numbers greater than 140/90.  Maintain healthy weight with goal BMI <30. Exercise 30 minutes per day, 5 days per week.  Smoking cessation encouraged to aid in BP reduction.             Assessment & Plan    E11.9 Type 2 diabetes mellitus without complication, without long-term current use of insulin  J41.8 Mixed simple and mucopurulent chronic bronchitis  M79.10, T46.6X5A Myalgia due to statin  E66.3 Overweight  E55.9 Vitamin D deficiency  I25.10 Coronary artery disease involving native coronary artery of native heart without angina pectoris  E78.2 Mixed hyperlipidemia  S60.811A Abrasion of right wrist, initial encounter  F17.200 Tobacco dependence  I10 Primary hypertension    IMPRESSION:   A1c increased from 5.9 to 6.1, indicating good diabetes control through diet alone.   Continued weight loss, with patient losing 13 lbs over 6 months through diet control.   Evaluated cholesterol management, considering CAD with calcium score of 494 in 2022.   Discussed statin intolerance and alternative cholesterol-lowering medication options.   Considered aspirin therapy for CAD but patient expressed concerns.   Vitamin D levels low at 14 (target >30).   COPD well-controlled with Breztri without need for rescue inhaler use.    E11.9 TYPE 2 DIABETES MELLITUS WITHOUT COMPLICATION, WITHOUT LONG-TERM CURRENT USE OF INSULIN:   Discussed the importance of lowering LDL cholesterol to prevent further plaque buildup, especially with diabetes.    E55.9 VITAMIN D DEFICIENCY:   Explained the role of vitamin D in bone health and the need for supplementation when levels are low.   Started vitamin D (cholecalciferol) 50,000 IU once weekly for 6 months, then transition to OTC supplementation.    I25.10 CORONARY ARTERY DISEASE INVOLVING NATIVE CORONARY ARTERY OF NATIVE HEART WITHOUT  ANGINA PECTORIS:   Explained the difference between enteric-coated aspirin and regular aspirin in terms of stomach effects, and educated on the purpose of aspirin therapy for heart disease prevention.   Discussed the significance of coronary artery calcium score and its relation to heart disease.    E78.2 MIXED HYPERLIPIDEMIA:   No additional content needed as the importance of lowering LDL cholesterol has been addressed under the diabetes section.    I10 PRIMARY HYPERTENSION:   Continued amlodipine 2.5 mg daily for BP management.         Future Appointments   Date Time Provider Department Center   10/15/2025  8:00 AM LAB, [facility] LABORATORY DRAW STATION [facility] KEREN [facility]   10/20/2025  8:30 AM Deisi Byrd FNP-[facility]       Follow up if symptoms worsen or fail to improve, for Keep appointment as scheduled. Call sooner if needed.    PEYTON Ying    Lab Frequency Next Occurrence   CBC Auto Differential Once 10/10/2025   Comprehensive Metabolic Panel Once 10/10/2025   Lipid Panel Once 10/10/2025   TSH Once 10/10/2025   Hemoglobin A1C Once 10/10/2025   Vitamin D Once 10/10/2025   Microalbumin/Creatinine Ratio, Urine Once 10/10/2025   Ambulatory referral/consult to Pulmonology Once 01/13/2025            This note was generated with the assistance of ambient listening technology. Verbal consent was obtained by the patient and accompanying visitor(s) for the recording of patient appointment to facilitate this note. I attest to having reviewed and edited the generated note for accuracy, though some syntax or spelling errors may persist. Please contact the author of this note for any clarification.

## 2025-04-11 NOTE — ASSESSMENT & PLAN NOTE
Diabetes labs:   Lab Results   Component Value Date    HGBA1C 6.1 (H) 04/04/2025      Continue current medications.   Take all medications as directed; compliance is critical for managing your diabetes.   Follow American Diabetic Association (ADA) dietary guidelines for eating and implement exercise into your daily regimen.   Check your glucose levels each morning and record for review at follow up.

## 2025-04-25 NOTE — ASSESSMENT & PLAN NOTE
Not at goal of 70  Cholesterol not at goal, patient refuses to take any additional medication.  She is tolerating her trial of Nexlitol so will continue that for now.

## 2025-04-25 NOTE — ASSESSMENT & PLAN NOTE
Lab Results   Component Value Date    CHOL 176 10/08/2024    HDL 39 (L) 10/08/2024    LDLDIRECT 109.4 (H) 10/08/2024    TRIG 137 10/08/2024     Stable. Continue with Nexlitol.   LDL Goal: <70  Educated on dietary modifications. Follow a low cholesterol, low saturated fat diet with less that 200mg of cholesterol a day.  Avoid fried foods and high saturated fats.  Increase dietary fiber.  Regular exercise can reduce LDL (bad cholesterol) and raise HDL (good cholesterol). Encourage physical activity 5 times per week for 30 minutes per day.

## 2025-04-25 NOTE — ASSESSMENT & PLAN NOTE

## 2025-05-01 DIAGNOSIS — I25.10 ATHEROSCLEROSIS OF NATIVE CORONARY ARTERY OF NATIVE HEART WITHOUT ANGINA PECTORIS: ICD-10-CM

## 2025-05-01 RX ORDER — BEMPEDOIC ACID 180 MG/1
180 TABLET, FILM COATED ORAL DAILY
Qty: 90 TABLET | Refills: 3 | Status: SHIPPED | OUTPATIENT
Start: 2025-05-01

## 2025-05-16 ENCOUNTER — HOSPITAL ENCOUNTER (EMERGENCY)
Facility: HOSPITAL | Age: 60
Discharge: LEFT BEFORE DISCHARGE | End: 2025-05-16
Payer: MEDICARE

## 2025-05-16 ENCOUNTER — OFFICE VISIT (OUTPATIENT)
Dept: FAMILY MEDICINE | Facility: CLINIC | Age: 60
End: 2025-05-16
Payer: MEDICARE

## 2025-05-16 VITALS
HEART RATE: 88 BPM | DIASTOLIC BLOOD PRESSURE: 82 MMHG | OXYGEN SATURATION: 95 % | HEIGHT: 63 IN | SYSTOLIC BLOOD PRESSURE: 118 MMHG | WEIGHT: 165 LBS | BODY MASS INDEX: 29.23 KG/M2 | TEMPERATURE: 98 F
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
## 2025-05-16 PROCEDURE — 99900041 HC LEFT WITHOUT BEING SEEN- EMERGENCY

## 2025-05-16 RX ORDER — DOXYCYCLINE 100 MG/1
100 CAPSULE ORAL 2 TIMES DAILY
Qty: 10 CAPSULE | Refills: 0 | Status: SHIPPED | OUTPATIENT
Start: 2025-05-16 | End: 2025-05-21

## 2025-05-16 RX ORDER — METRONIDAZOLE 500 MG/1
500 TABLET ORAL EVERY 8 HOURS
Qty: 15 TABLET | Refills: 0 | Status: SHIPPED | OUTPATIENT
Start: 2025-05-16 | End: 2025-05-21

## 2025-05-16 NOTE — PROGRESS NOTES
Patient ID: Ricardo Daley  : 1965    Chief Complaint: Animal Bite    Allergies: Patient is allergic to latex, lincocin [lincomycin], penicillins, olga-24 [theophylline], and clobetasol propionate.     History of Present Illness:  The patient is a 60 y.o. White female who presents to clinic for follow up on Animal Bite     Presents today with lacerations to the right hand.  Patients dogs were fighting at home and she attempted to break it up and subsequently she got bit.  She states the dog's shots are not up-to-date.  Bleeding is now controlled.  She cleansed the wounds at home.  She is not interested in sutures, would prefer that we just clean and apply Steri-Strips.  Last tetanus shot was 20 years ago.    Social History:  reports that she has been smoking cigarettes. She started smoking about 42 years ago. She has a 10.6 pack-year smoking history. She has been exposed to tobacco smoke. She has never used smokeless tobacco. She reports that she does not drink alcohol and does not use drugs.    Past Medical History:  has a past medical history of Carpal tunnel syndrome, Chronic bronchitis with wheezing, Chronic lumbar radiculopathy, Class 1 obesity with serious comorbidity and body mass index (BMI) of 31.0 to 31.9 in adult, COPD (chronic obstructive pulmonary disease), Family history of early CAD, Hyperlipidemia, IFG (impaired fasting glucose), Psoriasis, and Statin intolerance.    Current Medications:  Current Outpatient Medications   Medication Instructions    albuterol (PROVENTIL) 2.5 mg /3 mL (0.083 %) nebulizer solution INHALE THE CONTENTS OF 1 VIAL VIA NEBULIZER FOUR TIMES DAILY AS NEEDED FOR WHEEZING.    albuterol (PROVENTIL/VENTOLIN HFA) 90 mcg/actuation inhaler USE 1 TO 2 INHALATIONS BY MOUTH  EVERY 6 HOURS AS NEEDED FOR  WHEEZING    alcohol swabs (BD ALCOHOL SWABS) PadM 1 each, Topical (Top), Daily    amLODIPine (NORVASC) 5 mg    blood sugar diagnostic (ACCU-CHEK GUIDE TEST STRIPS) Strp 1 strip,  "Misc.(Non-Drug; Combo Route), 4 times daily    blood-glucose meter (ACCU-CHEK GUIDE GLUCOSE METER) Misc USE TO CHECK BLOOD SUGAR TWICE  DAILY    blood-glucose meter (TRUE METRIX AIR GLUCOSE METER) Misc USE AS DIRECTED    budesonide-glycopyr-formoterol (BREZTRI AEROSPHERE) 160-9-4.8 mcg/actuation HFAA 2 puffs, Inhalation, 2 times daily    calcipotriene (DOVONOX) 0.005 % cream 1 application , 2 times daily    cholecalciferol (vitamin D3) 50,000 Units, Oral, Every 7 days    doxycycline (VIBRAMYCIN) 100 mg, Oral, 2 times daily    fluticasone propionate (FLONASE) 100 mcg, Each Nostril    hydrOXYzine pamoate (VISTARIL) 25 mg, Oral, Every 6 hours PRN    ipratropium (ATROVENT) 0.02 % nebulizer solution SMARTSI Vial(s) Via Nebulizer 4 Times Daily PRN    metroNIDAZOLE (FLAGYL) 500 mg, Oral, Every 8 hours    NEXLETOL 180 mg, Oral, Daily    triamcinolone acetonide 0.1% (KENALOG) 0.1 % ointment 2 times daily       Review of Systems   See HPI    Visit Vitals  /82 (BP Location: Right arm)   Pulse 88   Temp 97.6 °F (36.4 °C) (Temporal)   Ht 5' 2.99" (1.6 m)   Wt 74.8 kg (165 lb)   SpO2 95%   BMI 29.24 kg/m²       Physical Exam  Vitals reviewed.   Constitutional:       Appearance: Normal appearance.   Musculoskeletal:      Right hand: Laceration (2 lacerations to dorsal hand, 2 small lacerations right thumb) present.      Comments: See PIC   Skin:     General: Skin is warm and dry.        Wounds cleansed with Hibiclens, Steri-Strips applied, thumb splint applied.  Patient tolerated well.              Assessment & Plan:  1. Dog bite, initial encounter  Comments:  Patient refuses sutures. Tetanus given.   Keep steri-strips in place until they fall off.  Take Doxy and Flagyl x 5 days.  RTC next week for check up  Orders:  -     doxycycline (VIBRAMYCIN) 100 MG Cap; Take 1 capsule (100 mg total) by mouth 2 (two) times daily. for 5 days  Dispense: 10 capsule; Refill: 0  -     metroNIDAZOLE (FLAGYL) 500 MG tablet; Take 1 tablet " (500 mg total) by mouth every 8 (eight) hours. for 5 days  Dispense: 15 tablet; Refill: 0  -     Tdap (BOOSTRIX) vaccine injection 0.5 mL         Future Appointments   Date Time Provider Department Center   5/16/2025  3:30 PM Deisi Byrd FNP-C [facility]   10/15/2025  8:00 AM LAB, [facility] LABORATORY DRAW STATION [facility]   10/20/2025  8:30 AM Deisi Byrd FNP-C [facility]       Follow up for 1 wk f/u recheck wounds. Call sooner if needed.    PEYTON Ying    Lab Frequency Next Occurrence   CBC Auto Differential Once 10/10/2025   Comprehensive Metabolic Panel Once 10/10/2025   Lipid Panel Once 10/10/2025   TSH Once 10/10/2025   Hemoglobin A1C Once 10/10/2025   Vitamin D Once 10/10/2025   Microalbumin/Creatinine Ratio, Urine Once 10/10/2025   Ambulatory referral/consult to Pulmonology Once 01/13/2025

## 2025-05-22 ENCOUNTER — OFFICE VISIT (OUTPATIENT)
Dept: FAMILY MEDICINE | Facility: CLINIC | Age: 60
End: 2025-05-22
Payer: MEDICARE

## 2025-05-22 VITALS
DIASTOLIC BLOOD PRESSURE: 82 MMHG | OXYGEN SATURATION: 98 % | HEIGHT: 63 IN | SYSTOLIC BLOOD PRESSURE: 138 MMHG | TEMPERATURE: 99 F | BODY MASS INDEX: 29.73 KG/M2 | HEART RATE: 86 BPM | WEIGHT: 167.81 LBS
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
## 2025-05-22 RX ORDER — ONDANSETRON 4 MG/1
4 TABLET, ORALLY DISINTEGRATING ORAL DAILY PRN
COMMUNITY
Start: 2025-05-16

## 2025-05-22 NOTE — PROGRESS NOTES
Patient ID: Ricardo Daley  : 1965    Chief Complaint: 1 wk Wound Check    Allergies: Patient is allergic to latex, lincocin [lincomycin], metronidazole, penicillins, olga-24 [theophylline], and clobetasol propionate.     History of Present Illness:  The patient is a 60 y.o. White female who presents to clinic for follow up on 1 wk Wound Check     Here to follow up on her dog bites.  She was breaking up a fight on the  of this month between her own dogs and sustained several dog bites to her right hand.  She was seen here at clinic, where her wounds were cleansed, she declined any sutures.  She was started on doxy as well as Flagyl as she is allergic to penicillin.  Her tetanus shot was also done to get her up-to-date.    Social History:  reports that she has been smoking cigarettes. She started smoking about 42 years ago. She has a 10.6 pack-year smoking history. She has been exposed to tobacco smoke. She has never used smokeless tobacco. She reports that she does not drink alcohol and does not use drugs.    Past Medical History:  has a past medical history of Carpal tunnel syndrome, Chronic bronchitis with wheezing, Chronic lumbar radiculopathy, Class 1 obesity with serious comorbidity and body mass index (BMI) of 31.0 to 31.9 in adult, COPD (chronic obstructive pulmonary disease), Family history of early CAD, Hyperlipidemia, IFG (impaired fasting glucose), Psoriasis, and Statin intolerance.    Current Medications:  Current Outpatient Medications   Medication Instructions    albuterol (PROVENTIL) 2.5 mg /3 mL (0.083 %) nebulizer solution INHALE THE CONTENTS OF 1 VIAL VIA NEBULIZER FOUR TIMES DAILY AS NEEDED FOR WHEEZING.    albuterol (PROVENTIL/VENTOLIN HFA) 90 mcg/actuation inhaler USE 1 TO 2 INHALATIONS BY MOUTH  EVERY 6 HOURS AS NEEDED FOR  WHEEZING    alcohol swabs (BD ALCOHOL SWABS) PadM 1 each, Topical (Top), Daily    amLODIPine (NORVASC) 5 mg    blood sugar diagnostic (ACCU-CHEK GUIDE TEST STRIPS)  "Strp 1 strip, Misc.(Non-Drug; Combo Route), 4 times daily    blood-glucose meter (ACCU-CHEK GUIDE GLUCOSE METER) Misc USE TO CHECK BLOOD SUGAR TWICE  DAILY    blood-glucose meter (TRUE METRIX AIR GLUCOSE METER) Misc USE AS DIRECTED    budesonide-glycopyr-formoterol (BREZTRI AEROSPHERE) 160-9-4.8 mcg/actuation HFAA 2 puffs, Inhalation, 2 times daily    calcipotriene (DOVONOX) 0.005 % cream 1 application , 2 times daily    cholecalciferol (vitamin D3) 50,000 Units, Oral, Every 7 days    fluticasone propionate (FLONASE) 100 mcg, Each Nostril    hydrOXYzine pamoate (VISTARIL) 25 mg, Oral, Every 6 hours PRN    ipratropium (ATROVENT) 0.02 % nebulizer solution SMARTSI Vial(s) Via Nebulizer 4 Times Daily PRN    NEXLETOL 180 mg, Oral, Daily    ondansetron (ZOFRAN-ODT) 4 mg, Daily PRN    triamcinolone acetonide 0.1% (KENALOG) 0.1 % ointment 2 times daily       Review of Systems   See HPI    Visit Vitals  /82 (BP Location: Left arm, Patient Position: Sitting)   Pulse 86   Temp 98.6 °F (37 °C) (Oral)   Ht 5' 2.99" (1.6 m)   Wt 76.1 kg (167 lb 12.8 oz)   SpO2 98%   BMI 29.73 kg/m²       Physical Exam  Vitals reviewed.   Constitutional:       Appearance: Normal appearance.   Eyes:      Conjunctiva/sclera: Conjunctivae normal.   Pulmonary:      Effort: Pulmonary effort is normal.   Skin:     General: Skin is warm and dry.   Neurological:      Mental Status: She is oriented to person, place, and time.          Labs Reviewed:  Chemistry:  Lab Results   Component Value Date     2025    K 4.1 2025    BUN 17 2025    CREATININE 0.88 2025    EGFRNORACEVR 75 2025    CALCIUM 9.2 2025    ALKPHOS 71 2025    ALBUMIN 4.0 2025    AST 20 2025    ALT 17 2025    [id number] 14 (L) 2025    TSH 1.270 10/08/2024        Lab Results   Component Value Date    HGBA1C 6.1 (H) 2025          Assessment & Plan:  1. Dog bite, subsequent encounter  Comments:  Dog bites " healing well. She completed the Doxy but had a reaction to the Flagyl and d/c'd after one dose.         Future Appointments   Date Time Provider Department Center   10/15/2025  8:00 AM LAB, [facility] LABORATORY DRAW STATION [facility] KEREN [place]   10/20/2025  8:30 AM [facility]       Follow up if symptoms worsen or fail to improve, for Keep appointment as scheduled. Call sooner if needed.    PEYTON Ying    Lab Frequency Next Occurrence   CBC Auto Differential Once 10/10/2025   Comprehensive Metabolic Panel Once 10/10/2025   Lipid Panel Once 10/10/2025   TSH Once 10/10/2025   Hemoglobin A1C Once 10/10/2025   Vitamin D Once 10/10/2025   Microalbumin/Creatinine Ratio, Urine Once 10/10/2025   Ambulatory referral/consult to Pulmonology Once 01/13/2025

## 2025-05-27 DIAGNOSIS — E11.9 TYPE 2 DIABETES MELLITUS WITHOUT COMPLICATION, WITHOUT LONG-TERM CURRENT USE OF INSULIN: ICD-10-CM

## 2025-05-27 RX ORDER — BLOOD-GLUCOSE METER
EACH MISCELLANEOUS
Qty: 1 EACH | Refills: 0 | Status: SHIPPED | OUTPATIENT
Start: 2025-05-27

## 2025-05-28 ENCOUNTER — PATIENT OUTREACH (OUTPATIENT)
Facility: CLINIC | Age: 60
End: 2025-05-28
Payer: MEDICARE

## 2025-07-16 ENCOUNTER — OFFICE VISIT (OUTPATIENT)
Dept: FAMILY MEDICINE | Facility: CLINIC | Age: 60
End: 2025-07-16
Payer: MEDICARE

## 2025-07-16 VITALS
TEMPERATURE: 98 F | BODY MASS INDEX: 29.95 KG/M2 | SYSTOLIC BLOOD PRESSURE: 122 MMHG | DIASTOLIC BLOOD PRESSURE: 72 MMHG | HEIGHT: 63 IN | HEART RATE: 83 BPM | WEIGHT: 169 LBS | OXYGEN SATURATION: 95 %

## 2025-07-16 DIAGNOSIS — H10.32 ACUTE BACTERIAL CONJUNCTIVITIS OF LEFT EYE: Primary | ICD-10-CM

## 2025-07-16 PROCEDURE — 99214 OFFICE O/P EST MOD 30 MIN: CPT | Mod: ,,, | Performed by: NURSE PRACTITIONER

## 2025-07-16 PROCEDURE — 3008F BODY MASS INDEX DOCD: CPT | Mod: ,,, | Performed by: NURSE PRACTITIONER

## 2025-07-16 PROCEDURE — 3044F HG A1C LEVEL LT 7.0%: CPT | Mod: ,,, | Performed by: NURSE PRACTITIONER

## 2025-07-16 PROCEDURE — 3078F DIAST BP <80 MM HG: CPT | Mod: ,,, | Performed by: NURSE PRACTITIONER

## 2025-07-16 PROCEDURE — 3074F SYST BP LT 130 MM HG: CPT | Mod: ,,, | Performed by: NURSE PRACTITIONER

## 2025-07-16 PROCEDURE — 1159F MED LIST DOCD IN RCRD: CPT | Mod: ,,, | Performed by: NURSE PRACTITIONER

## 2025-07-16 PROCEDURE — 1160F RVW MEDS BY RX/DR IN RCRD: CPT | Mod: ,,, | Performed by: NURSE PRACTITIONER

## 2025-07-16 RX ORDER — ERYTHROMYCIN 5 MG/G
OINTMENT OPHTHALMIC 3 TIMES DAILY
Qty: 3.5 G | Refills: 0 | Status: SHIPPED | OUTPATIENT
Start: 2025-07-16

## 2025-07-16 NOTE — PROGRESS NOTES
Patient ID: Ricardo Daley  : 1965    Chief Complaint: Conjunctivitis    Allergies: Patient is allergic to latex, lincocin [lincomycin], metronidazole, penicillins, olga-24 [theophylline], and clobetasol propionate.     Subjective :  The patient is a 60 y.o. White female who presents to clinic for follow up on Conjunctivitis   History of Present Illness    HPI:  Patient reports redness, itchiness, and irritation in her left eye, which began approximately 3-4 days ago. Her other eye had similar but less severe symptoms that have since resolved. She speculates that her eye condition might be related to allergies, as going outside typically causes inflammation in her legs. She attempted to treat the condition with  eye infection medication (not for pink eye), which she reports as somewhat effective but admits to inconsistent application. There is now a small amount of discharge present in the affected eye, significantly less than previously. She had minimal crust to clean off this morning. She also reports breathing difficulties, especially in hot weather.    She denies the presence of significant pus in her eye or any formal medical diagnoses.    MEDICATIONS:  Patient recently used  eye infection medicine for eye irritation. The medication was not specifically for pink eye.    MEDICAL HISTORY:  Patient has a history of a recent eye infection in her left eye.      ROS:  ROS as indicated in HPI.           Social History:  reports that she has been smoking cigarettes. She started smoking about 42 years ago. She has a 10.6 pack-year smoking history. She has been exposed to tobacco smoke. She has never used smokeless tobacco. She reports that she does not drink alcohol and does not use drugs.    Past Medical History:  has a past medical history of Carpal tunnel syndrome, Chronic bronchitis with wheezing, Chronic lumbar radiculopathy, Class 1 obesity with serious comorbidity and body mass index (BMI) of  "31.0 to 31.9 in adult, COPD (chronic obstructive pulmonary disease), Family history of early CAD, Hyperlipidemia, IFG (impaired fasting glucose), Psoriasis, and Statin intolerance.    Current Medications:  Current Outpatient Medications   Medication Instructions    ACCU-CHEK GUIDE GLUCOSE METER [facility] CHECK BLOOD SUGAR TWICE DAILY    albuterol (PROVENTIL) 2.5 mg /3 mL (0.083 %) nebulizer solution INHALE THE CONTENTS OF 1 VIAL VIA NEBULIZER FOUR TIMES DAILY AS NEEDED FOR WHEEZING.    albuterol (PROVENTIL/VENTOLIN HFA) 90 mcg/actuation inhaler USE 1 TO 2 INHALATIONS BY MOUTH  EVERY 6 HOURS AS NEEDED FOR  WHEEZING    alcohol swabs (BD ALCOHOL SWABS) PadM 1 each, Topical (Top), Daily    amLODIPine (NORVASC) 5 mg    blood sugar diagnostic (ACCU-CHEK GUIDE TEST STRIPS) Strp 1 strip, Misc.(Non-Drug; Combo Route), 4 times daily    blood-glucose meter (TRUE METRIX AIR GLUCOSE METER) Misc USE AS DIRECTED    budesonide-glycopyr-formoterol (BREZTRI AEROSPHERE) 160-9-4.8 mcg/actuation HFAA 2 puffs, Inhalation, 2 times daily    calcipotriene (DOVONOX) 0.005 % cream 1 application , 2 times daily    cholecalciferol (vitamin D3) 50,000 Units, Oral, Every 7 days    erythromycin (ROMYCIN) ophthalmic ointment Left Eye, 3 times daily    fluticasone propionate (FLONASE) 100 mcg, Each Nostril    hydrOXYzine pamoate (VISTARIL) 25 mg, Oral, Every 6 hours PRN    ipratropium (ATROVENT) 0.02 % nebulizer solution SMARTSI Vial(s) Via Nebulizer 4 Times Daily PRN    NEXLETOL 180 mg, Oral, Daily    ondansetron (ZOFRAN-ODT) 4 mg, Daily PRN    triamcinolone acetonide 0.1% (KENALOG) 0.1 % ointment 2 times daily         Visit Vitals  /72 (BP Location: Left arm)   Pulse 83   Temp 97.5 °F (36.4 °C) (Temporal)   Ht 5' 2.99" (1.6 m)   Wt 76.7 kg (169 lb)   SpO2 95%   BMI 29.95 kg/m²       Physical Exam  Vitals reviewed.   Constitutional:       Appearance: Normal appearance.   Eyes:      General:         Left eye: Discharge (injected conjunctiva; " purulent dischage noted) present.  Skin:     General: Skin is warm and dry.   Neurological:      Mental Status: She is oriented to person, place, and time.          Labs Reviewed:  Chemistry:  Lab Results   Component Value Date     2025    K 4.1 2025    BUN 17 2025    CREATININE 0.88 2025    EGFRNORACEVR 75 2025    CALCIUM 9.2 2025    ALKPHOS 71 2025    ALBUMIN 4.0 2025    AST 20 2025    ALT 17 2025    [id number] 14 (L) 2025    TSH 1.270 10/08/2024        Lab Results   Component Value Date    HGBA1C 6.1 (H) 2025          Assessment & Plan:  1. Acute bacterial conjunctivitis of left eye  -     erythromycin (ROMYCIN) ophthalmic ointment; Place into the left eye 3 (three) times daily.  Dispense: 3.5 g; Refill: 0         Assessment & Plan    H10.32 Acute bacterial conjunctivitis of left eye    IMPRESSION:   Assessed left eye complaint: red, itchy, irritated for 3-4 days.   Mild discharge present, but significantly improved from previous condition.   Considered allergic etiology given history of outdoor exposure causing inflammation.   Evaluated effectiveness of  eye medication patient had been using.    H10.32 ACUTE BACTERIAL CONJUNCTIVITIS OF LEFT EYE:   Explained proper eye hygiene, including using a warm compress to remove crusts and separate cloths for each eye to prevent cross-contamination.         Future Appointments   Date Time Provider Department Center   10/15/2025  8:00 AM LAB, [facility] LABORATORY DRAW STATION [facility] KEREN [facility]   10/20/2025  8:30 AM [facility]       Follow up if symptoms worsen or fail to improve. Call sooner if needed.    PEYTON Ying    Lab Frequency Next Occurrence   CBC Auto Differential Once 10/10/2025   Comprehensive Metabolic Panel Once 10/10/2025   Lipid Panel Once 10/10/2025   TSH Once 10/10/2025   Hemoglobin A1C Once 10/10/2025   Vitamin D Once  10/10/2025   Microalbumin/Creatinine Ratio, Urine Once 10/10/2025   Ambulatory referral/consult to Pulmonology Once 01/13/2025            This note was generated with the assistance of ambient listening technology. Verbal consent was obtained by the patient and accompanying visitor(s) for the recording of patient appointment to facilitate this note. I attest to having reviewed and edited the generated note for accuracy, though some syntax or spelling errors may persist. Please contact the author of this note for any clarification.

## 2025-08-10 ENCOUNTER — HOSPITAL ENCOUNTER (EMERGENCY)
Facility: HOSPITAL | Age: 60
Discharge: HOME | End: 2025-08-10
Attending: EMERGENCY MEDICINE
Payer: MEDICARE

## 2025-08-10 VITALS
TEMPERATURE: 98 F | HEIGHT: 62 IN | HEART RATE: 84 BPM | SYSTOLIC BLOOD PRESSURE: 155 MMHG | BODY MASS INDEX: 31.28 KG/M2 | DIASTOLIC BLOOD PRESSURE: 103 MMHG | OXYGEN SATURATION: 97 % | WEIGHT: 170 LBS | RESPIRATION RATE: 18 BRPM
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
## 2025-08-10 DIAGNOSIS — S81.831A PUNCTURE WOUND OF MULTIPLE SITES OF RIGHT LOWER EXTREMITY, INITIAL ENCOUNTER: ICD-10-CM

## 2025-08-10 DIAGNOSIS — S81.812A LEG LACERATION, LEFT, INITIAL ENCOUNTER: ICD-10-CM

## 2025-08-10 PROCEDURE — 12035 INTMD RPR S/A/T/EXT 12.6-20: CPT

## 2025-08-10 PROCEDURE — 63600175 PHARM REV CODE 636 W HCPCS: Performed by: EMERGENCY MEDICINE

## 2025-08-10 PROCEDURE — 25000003 PHARM REV CODE 250: Performed by: EMERGENCY MEDICINE

## 2025-08-10 PROCEDURE — 99284 EMERGENCY DEPT VISIT MOD MDM: CPT | Mod: 25

## 2025-08-10 RX ORDER — AMOXICILLIN AND CLAVULANATE POTASSIUM 875; 125 MG/1; MG/1
1 TABLET, FILM COATED ORAL
Status: COMPLETED | OUTPATIENT
Start: 2025-08-10 | End: 2025-08-10

## 2025-08-10 RX ORDER — LIDOCAINE HYDROCHLORIDE AND EPINEPHRINE 10; 10 UG/ML; MG/ML
20 INJECTION, SOLUTION INFILTRATION; PERINEURAL ONCE
Status: COMPLETED | OUTPATIENT
Start: 2025-08-10 | End: 2025-08-10

## 2025-08-10 RX ORDER — AMOXICILLIN AND CLAVULANATE POTASSIUM 875; 125 MG/1; MG/1
1 TABLET, FILM COATED ORAL 2 TIMES DAILY
Qty: 20 TABLET | Refills: 0 | Status: SHIPPED | OUTPATIENT
Start: 2025-08-10 | End: 2025-08-20

## 2025-08-10 RX ORDER — HYDROCODONE BITARTRATE AND ACETAMINOPHEN 5; 325 MG/1; MG/1
1 TABLET ORAL EVERY 6 HOURS PRN
Qty: 12 TABLET | Refills: 0 | Status: SHIPPED | OUTPATIENT
Start: 2025-08-10

## 2025-08-10 RX ADMIN — AMOXICILLIN AND CLAVULANATE POTASSIUM 1 TABLET: 875; 125 TABLET, FILM COATED ORAL at 08:08

## 2025-08-10 RX ADMIN — LIDOCAINE HYDROCHLORIDE,EPINEPHRINE BITARTRATE 20 ML: 10; .01 INJECTION, SOLUTION INFILTRATION; PERINEURAL at 06:08

## 2025-08-10 RX ADMIN — BACITRACIN ZINC, NEOMYCIN, POLYMYXIN B SULFAT 1 EACH: 5000; 3.5; 4 OINTMENT TOPICAL at 08:08

## 2025-08-11 ENCOUNTER — OFFICE VISIT (OUTPATIENT)
Dept: FAMILY MEDICINE | Facility: CLINIC | Age: 60
End: 2025-08-11
Payer: MEDICARE

## 2025-08-11 VITALS
SYSTOLIC BLOOD PRESSURE: 122 MMHG | WEIGHT: 170.38 LBS | HEART RATE: 87 BPM | TEMPERATURE: 97 F | BODY MASS INDEX: 31.35 KG/M2 | OXYGEN SATURATION: 96 % | HEIGHT: 62 IN | DIASTOLIC BLOOD PRESSURE: 70 MMHG

## 2025-08-11 DIAGNOSIS — F17.200 NEEDS SMOKING CESSATION EDUCATION: ICD-10-CM

## 2025-08-11 DIAGNOSIS — S81.051A DOG BITE OF RIGHT KNEE, INITIAL ENCOUNTER: ICD-10-CM

## 2025-08-11 DIAGNOSIS — S81.852A DOG BITE OF LEFT LOWER LEG, INITIAL ENCOUNTER: Primary | ICD-10-CM
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
## 2025-08-11 PROCEDURE — 1160F RVW MEDS BY RX/DR IN RCRD: CPT | Mod: ,,, | Performed by: NURSE PRACTITIONER

## 2025-08-11 PROCEDURE — 3008F BODY MASS INDEX DOCD: CPT | Mod: ,,, | Performed by: NURSE PRACTITIONER

## 2025-08-11 PROCEDURE — 1159F MED LIST DOCD IN RCRD: CPT | Mod: ,,, | Performed by: NURSE PRACTITIONER

## 2025-08-11 PROCEDURE — 99214 OFFICE O/P EST MOD 30 MIN: CPT | Mod: ,,, | Performed by: NURSE PRACTITIONER

## 2025-08-11 PROCEDURE — 3044F HG A1C LEVEL LT 7.0%: CPT | Mod: ,,, | Performed by: NURSE PRACTITIONER

## 2025-08-11 PROCEDURE — 3078F DIAST BP <80 MM HG: CPT | Mod: ,,, | Performed by: NURSE PRACTITIONER

## 2025-08-11 PROCEDURE — 3074F SYST BP LT 130 MM HG: CPT | Mod: ,,, | Performed by: NURSE PRACTITIONER

## 2025-08-26 ENCOUNTER — OFFICE VISIT (OUTPATIENT)
Dept: FAMILY MEDICINE | Facility: CLINIC | Age: 60
End: 2025-08-26
Payer: MEDICARE

## 2025-08-26 VITALS
TEMPERATURE: 98 F | HEART RATE: 84 BPM | BODY MASS INDEX: 31.72 KG/M2 | WEIGHT: 172.38 LBS | SYSTOLIC BLOOD PRESSURE: 134 MMHG | DIASTOLIC BLOOD PRESSURE: 76 MMHG | OXYGEN SATURATION: 97 % | HEIGHT: 62 IN
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
## 2025-08-26 DIAGNOSIS — S81.852A DOG BITE OF LEFT LOWER LEG, INITIAL ENCOUNTER: Primary | ICD-10-CM

## 2025-08-26 PROCEDURE — 3044F HG A1C LEVEL LT 7.0%: CPT | Mod: ,,, | Performed by: NURSE PRACTITIONER

## 2025-08-26 PROCEDURE — 1160F RVW MEDS BY RX/DR IN RCRD: CPT | Mod: ,,, | Performed by: NURSE PRACTITIONER

## 2025-08-26 PROCEDURE — 99214 OFFICE O/P EST MOD 30 MIN: CPT | Mod: ,,, | Performed by: NURSE PRACTITIONER

## 2025-08-26 PROCEDURE — 3078F DIAST BP <80 MM HG: CPT | Mod: ,,, | Performed by: NURSE PRACTITIONER

## 2025-08-26 PROCEDURE — 1159F MED LIST DOCD IN RCRD: CPT | Mod: ,,, | Performed by: NURSE PRACTITIONER

## 2025-08-26 PROCEDURE — 3075F SYST BP GE 130 - 139MM HG: CPT | Mod: ,,, | Performed by: NURSE PRACTITIONER

## 2025-08-26 PROCEDURE — 3008F BODY MASS INDEX DOCD: CPT | Mod: ,,, | Performed by: NURSE PRACTITIONER

## 2025-08-27 ENCOUNTER — OFFICE VISIT (OUTPATIENT)
Dept: FAMILY MEDICINE | Facility: CLINIC | Age: 60
End: 2025-08-27
Payer: MEDICARE

## 2025-08-27 ENCOUNTER — HOSPITAL ENCOUNTER (OUTPATIENT)
Dept: WOUND CARE | Facility: HOSPITAL | Age: 60
Discharge: HOME | End: 2025-08-27
Attending: NURSE PRACTITIONER
Payer: MEDICARE

## 2025-08-27 ENCOUNTER — TELEPHONE (OUTPATIENT)
Dept: FAMILY MEDICINE | Facility: CLINIC | Age: 60
End: 2025-08-27

## 2025-08-27 VITALS
SYSTOLIC BLOOD PRESSURE: 156 MMHG | RESPIRATION RATE: 19 BRPM | HEART RATE: 84 BPM | WEIGHT: 171.94 LBS | BODY MASS INDEX: 31.64 KG/M2 | HEIGHT: 62 IN | DIASTOLIC BLOOD PRESSURE: 84 MMHG

## 2025-08-27 VITALS
DIASTOLIC BLOOD PRESSURE: 62 MMHG | SYSTOLIC BLOOD PRESSURE: 110 MMHG | WEIGHT: 172 LBS | BODY MASS INDEX: 31.65 KG/M2 | HEART RATE: 83 BPM | HEIGHT: 62 IN | OXYGEN SATURATION: 97 % | TEMPERATURE: 98 F

## 2025-08-27 DIAGNOSIS — S81.852S DOG BITE OF LEFT LOWER LEG, SEQUELA: Primary | ICD-10-CM

## 2025-08-27 DIAGNOSIS — T14.8XXD DELAYED HEALING OF TRAUMATIC WOUND: Primary | ICD-10-CM
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
## 2025-08-27 DIAGNOSIS — I96 NECROSIS: ICD-10-CM

## 2025-08-27 PROCEDURE — 27000999 HC MEDICAL RECORD PHOTO DOCUMENTATION

## 2025-08-27 PROCEDURE — 99203 OFFICE O/P NEW LOW 30 MIN: CPT

## 2025-08-28 ENCOUNTER — TELEPHONE (OUTPATIENT)
Dept: FAMILY MEDICINE | Facility: CLINIC | Age: 60
End: 2025-08-28
Payer: MEDICARE

## 2025-08-28 ENCOUNTER — ANESTHESIA EVENT (OUTPATIENT)
Dept: SURGERY | Facility: HOSPITAL | Age: 60
End: 2025-08-28
Payer: MEDICARE

## 2025-08-28 ENCOUNTER — HOSPITAL ENCOUNTER (OUTPATIENT)
Dept: RADIOLOGY | Facility: HOSPITAL | Age: 60
Discharge: HOME | End: 2025-08-28
Attending: SURGERY
Payer: MEDICARE

## 2025-08-28 ENCOUNTER — HOSPITAL ENCOUNTER (OUTPATIENT)
Dept: PREADMISSION TESTING | Facility: HOSPITAL | Age: 60
Discharge: HOME | End: 2025-08-28
Attending: SURGERY
Payer: MEDICARE

## 2025-08-28 VITALS — HEIGHT: 62 IN | WEIGHT: 170.81 LBS | BODY MASS INDEX: 31.43 KG/M2

## 2025-08-28 DIAGNOSIS — L97.221 NON-PRESSURE CHRONIC ULCER OF LEFT CALF, LIMITED TO BREAKDOWN OF SKIN: Primary | ICD-10-CM

## 2025-08-28 DIAGNOSIS — Z01.818 PREOP TESTING: ICD-10-CM

## 2025-08-28 DIAGNOSIS — L97.201: ICD-10-CM

## 2025-08-28 LAB
ALBUMIN SERPL-MCNC: 3.7 G/DL (ref 3.4–4.8)
ALBUMIN/GLOB SERPL: 1.3 RATIO (ref 1.1–2)
ALP SERPL-CCNC: 81 UNIT/L (ref 40–150)
ALT SERPL-CCNC: 15 UNIT/L (ref 0–55)
ANION GAP SERPL CALC-SCNC: 9 MEQ/L
APTT PPP: 27.2 SECONDS (ref 23–29.4)
AST SERPL-CCNC: 15 UNIT/L (ref 11–45)
BASOPHILS # BLD AUTO: 0.05 X10(3)/MCL (ref 0.01–0.08)
BASOPHILS NFR BLD AUTO: 0.6 % (ref 0.1–1.2)
BILIRUB SERPL-MCNC: 0.5 MG/DL
BUN SERPL-MCNC: 13 MG/DL (ref 9.8–20.1)
CALCIUM SERPL-MCNC: 8.6 MG/DL (ref 8.4–10.2)
CHLORIDE SERPL-SCNC: 110 MMOL/L (ref 98–107)
CO2 SERPL-SCNC: 24 MMOL/L (ref 23–31)
CREAT SERPL-MCNC: 0.71 MG/DL (ref 0.55–1.02)
CREAT/UREA NIT SERPL: 18
EOSINOPHIL # BLD AUTO: 0.15 X10(3)/MCL (ref 0.04–0.36)
EOSINOPHIL NFR BLD AUTO: 1.8 % (ref 0.7–7)
ERYTHROCYTE [DISTWIDTH] IN BLOOD BY AUTOMATED COUNT: 13.4 % (ref 11–14.5)
GFR SERPLBLD CREATININE-BSD FMLA CKD-EPI: >90 ML/MIN/1.73/M2
GLOBULIN SER-MCNC: 2.9 GM/DL (ref 2.4–3.5)
GLUCOSE SERPL-MCNC: 92 MG/DL (ref 82–115)
HCT VFR BLD AUTO: 43.8 % (ref 36–48)
HGB BLD-MCNC: 14.1 G/DL (ref 11.8–16)
IMM GRANULOCYTES # BLD AUTO: 0.03 X10(3)/MCL (ref 0–0.03)
IMM GRANULOCYTES NFR BLD AUTO: 0.4 % (ref 0–0.5)
INR PPP: 0.9
LYMPHOCYTES # BLD AUTO: 2.07 X10(3)/MCL (ref 1.16–3.74)
LYMPHOCYTES NFR BLD AUTO: 24.8 % (ref 20–55)
MCH RBC QN AUTO: 27.8 PG (ref 27–34)
MCHC RBC AUTO-ENTMCNC: 32.2 G/DL (ref 31–37)
MCV RBC AUTO: 86.4 FL (ref 79–99)
MONOCYTES # BLD AUTO: 0.59 X10(3)/MCL (ref 0.24–0.36)
MONOCYTES NFR BLD AUTO: 7.1 % (ref 4.7–12.5)
NEUTROPHILS # BLD AUTO: 5.44 X10(3)/MCL (ref 1.56–6.13)
NEUTROPHILS NFR BLD AUTO: 65.3 % (ref 37–73)
NRBC BLD AUTO-RTO: 0 %
PLATELET # BLD AUTO: 305 X10(3)/MCL (ref 140–371)
PMV BLD AUTO: 9.1 FL (ref 9.4–12.4)
POTASSIUM SERPL-SCNC: 3.8 MMOL/L (ref 3.5–5.1)
PROT SERPL-MCNC: 6.6 GM/DL (ref 5.8–7.6)
PROTHROMBIN TIME: 9.2 SECONDS (ref 9.3–11.9)
RBC # BLD AUTO: 5.07 X10(6)/MCL (ref 4–5.1)
SODIUM SERPL-SCNC: 143 MMOL/L (ref 136–145)
WBC # BLD AUTO: 8.33 X10(3)/MCL (ref 4–11.5)

## 2025-08-28 PROCEDURE — 85610 PROTHROMBIN TIME: CPT | Performed by: SURGERY

## 2025-08-28 PROCEDURE — 85730 THROMBOPLASTIN TIME PARTIAL: CPT | Performed by: SURGERY

## 2025-08-28 PROCEDURE — 80053 COMPREHEN METABOLIC PANEL: CPT | Performed by: SURGERY

## 2025-08-28 PROCEDURE — 93005 ELECTROCARDIOGRAM TRACING: CPT

## 2025-08-28 PROCEDURE — 71046 X-RAY EXAM CHEST 2 VIEWS: CPT | Mod: TC

## 2025-08-28 PROCEDURE — 85025 COMPLETE CBC W/AUTO DIFF WBC: CPT | Performed by: SURGERY

## 2025-08-28 PROCEDURE — 93010 ELECTROCARDIOGRAM REPORT: CPT | Mod: ,,, | Performed by: INTERNAL MEDICINE

## 2025-08-28 RX ORDER — DEXTROSE MONOHYDRATE AND SODIUM CHLORIDE 5; .45 G/100ML; G/100ML
INJECTION, SOLUTION INTRAVENOUS CONTINUOUS
Status: CANCELLED | OUTPATIENT
Start: 2025-08-29

## 2025-08-29 ENCOUNTER — ANESTHESIA (OUTPATIENT)
Dept: SURGERY | Facility: HOSPITAL | Age: 60
End: 2025-08-29
Payer: MEDICARE

## 2025-08-29 PROBLEM — L97.201: Status: ACTIVE | Noted: 2025-08-29

## 2025-08-29 PROCEDURE — 25000003 PHARM REV CODE 250: Performed by: NURSE ANESTHETIST, CERTIFIED REGISTERED

## 2025-08-29 PROCEDURE — 63600175 PHARM REV CODE 636 W HCPCS: Performed by: NURSE ANESTHETIST, CERTIFIED REGISTERED

## 2025-08-29 RX ORDER — PROPOFOL 10 MG/ML
INJECTION, EMULSION INTRAVENOUS
Status: DISCONTINUED | OUTPATIENT
Start: 2025-08-29 | End: 2025-08-29

## 2025-08-29 RX ORDER — DEXAMETHASONE SODIUM PHOSPHATE 4 MG/ML
INJECTION, SOLUTION INTRA-ARTICULAR; INTRALESIONAL; INTRAMUSCULAR; INTRAVENOUS; SOFT TISSUE
Status: DISCONTINUED | OUTPATIENT
Start: 2025-08-29 | End: 2025-08-29

## 2025-08-29 RX ORDER — ONDANSETRON HYDROCHLORIDE 2 MG/ML
INJECTION, SOLUTION INTRAVENOUS
Status: DISCONTINUED | OUTPATIENT
Start: 2025-08-29 | End: 2025-08-29

## 2025-08-29 RX ORDER — DEXMEDETOMIDINE HYDROCHLORIDE 100 UG/ML
INJECTION, SOLUTION INTRAVENOUS
Status: DISCONTINUED | OUTPATIENT
Start: 2025-08-29 | End: 2025-08-29

## 2025-08-29 RX ORDER — FENTANYL CITRATE 50 UG/ML
INJECTION, SOLUTION INTRAMUSCULAR; INTRAVENOUS
Status: DISCONTINUED | OUTPATIENT
Start: 2025-08-29 | End: 2025-08-29

## 2025-08-29 RX ORDER — LIDOCAINE HYDROCHLORIDE 20 MG/ML
INJECTION INTRAVENOUS
Status: DISCONTINUED | OUTPATIENT
Start: 2025-08-29 | End: 2025-08-29

## 2025-08-29 RX ORDER — KETAMINE HYDROCHLORIDE 50 MG/ML
INJECTION, SOLUTION INTRAMUSCULAR; INTRAVENOUS
Status: DISCONTINUED | OUTPATIENT
Start: 2025-08-29 | End: 2025-08-29

## 2025-08-29 RX ORDER — ACETAMINOPHEN 10 MG/ML
INJECTION, SOLUTION INTRAVENOUS
Status: DISCONTINUED | OUTPATIENT
Start: 2025-08-29 | End: 2025-08-29

## 2025-08-29 RX ADMIN — LIDOCAINE HYDROCHLORIDE 50 MG: 20 INJECTION, SOLUTION INTRAVENOUS at 10:08

## 2025-08-29 RX ADMIN — ACETAMINOPHEN 1000 MG: 1000 INJECTION, SOLUTION INTRAVENOUS at 10:08

## 2025-08-29 RX ADMIN — KETAMINE HYDROCHLORIDE 10 MG: 50 INJECTION, SOLUTION INTRAMUSCULAR; INTRAVENOUS at 10:08

## 2025-08-29 RX ADMIN — FENTANYL CITRATE 50 MCG: 50 INJECTION, SOLUTION INTRAMUSCULAR; INTRAVENOUS at 10:08

## 2025-08-29 RX ADMIN — DEXAMETHASONE SODIUM PHOSPHATE 4 MG: 4 INJECTION, SOLUTION INTRA-ARTICULAR; INTRALESIONAL; INTRAMUSCULAR; INTRAVENOUS; SOFT TISSUE at 10:08

## 2025-08-29 RX ADMIN — PROPOFOL 90 MG: 10 INJECTION, EMULSION INTRAVENOUS at 10:08

## 2025-08-29 RX ADMIN — ONDANSETRON 4 MG: 2 INJECTION INTRAMUSCULAR; INTRAVENOUS at 10:08

## 2025-08-29 RX ADMIN — DEXMEDETOMIDINE 5 MCG: 100 INJECTION, SOLUTION, CONCENTRATE INTRAVENOUS at 10:08

## 2025-08-30 LAB
OHS QRS DURATION: 76 MS
OHS QTC CALCULATION: 421 MS